# Patient Record
Sex: FEMALE | Race: WHITE | NOT HISPANIC OR LATINO | URBAN - METROPOLITAN AREA
[De-identification: names, ages, dates, MRNs, and addresses within clinical notes are randomized per-mention and may not be internally consistent; named-entity substitution may affect disease eponyms.]

---

## 2019-12-05 ENCOUNTER — OUTPATIENT (OUTPATIENT)
Dept: OUTPATIENT SERVICES | Facility: HOSPITAL | Age: 74
LOS: 1 days | End: 2019-12-05
Payer: MEDICARE

## 2019-12-05 PROCEDURE — 78227 HEPATOBIL SYST IMAGE W/DRUG: CPT

## 2019-12-05 PROCEDURE — 78227 HEPATOBIL SYST IMAGE W/DRUG: CPT | Mod: 26

## 2019-12-05 PROCEDURE — A9537: CPT

## 2019-12-19 ENCOUNTER — OUTPATIENT (OUTPATIENT)
Dept: OUTPATIENT SERVICES | Facility: HOSPITAL | Age: 74
LOS: 1 days | Discharge: HOME | End: 2019-12-19
Payer: MEDICARE

## 2019-12-19 ENCOUNTER — RESULT REVIEW (OUTPATIENT)
Age: 74
End: 2019-12-19

## 2019-12-19 PROCEDURE — 88302 TISSUE EXAM BY PATHOLOGIST: CPT | Mod: 26

## 2019-12-19 PROCEDURE — 49585: CPT | Mod: 82

## 2019-12-19 PROCEDURE — 88304 TISSUE EXAM BY PATHOLOGIST: CPT | Mod: 26

## 2019-12-19 PROCEDURE — S2900 ROBOTIC SURGICAL SYSTEM: CPT | Mod: NC

## 2019-12-19 PROCEDURE — 47562 LAPAROSCOPIC CHOLECYSTECTOMY: CPT | Mod: 82

## 2019-12-30 LAB — SURGICAL PATHOLOGY STUDY: SIGNIFICANT CHANGE UP

## 2021-09-20 ENCOUNTER — OUTPATIENT (OUTPATIENT)
Dept: OUTPATIENT SERVICES | Facility: HOSPITAL | Age: 76
LOS: 1 days | End: 2021-09-20
Payer: MEDICARE

## 2021-09-20 PROCEDURE — 73600 X-RAY EXAM OF ANKLE: CPT

## 2021-09-20 PROCEDURE — 73630 X-RAY EXAM OF FOOT: CPT

## 2021-09-20 PROCEDURE — 73600 X-RAY EXAM OF ANKLE: CPT | Mod: 26,RT

## 2021-09-20 PROCEDURE — 73630 X-RAY EXAM OF FOOT: CPT | Mod: 26,RT

## 2022-01-20 ENCOUNTER — TRANSCRIPTION ENCOUNTER (OUTPATIENT)
Age: 77
End: 2022-01-20

## 2022-01-20 VITALS
SYSTOLIC BLOOD PRESSURE: 165 MMHG | RESPIRATION RATE: 16 BRPM | OXYGEN SATURATION: 100 % | WEIGHT: 124.34 LBS | TEMPERATURE: 98 F | HEIGHT: 64 IN | DIASTOLIC BLOOD PRESSURE: 92 MMHG | HEART RATE: 87 BPM

## 2022-01-20 RX ORDER — POVIDONE-IODINE 5 %
1 AEROSOL (ML) TOPICAL ONCE
Refills: 0 | Status: COMPLETED | OUTPATIENT
Start: 2022-01-21 | End: 2022-01-21

## 2022-01-20 RX ORDER — SUCRALFATE 1 G
1 TABLET ORAL
Qty: 0 | Refills: 0 | DISCHARGE

## 2022-01-20 NOTE — PATIENT PROFILE ADULT - STATED REASON FOR ADMISSION
total ankle replacement, PSBL achilles lengthening, right total ankle replacement, PSBL achilles lengthening, right..

## 2022-01-20 NOTE — H&P ADULT - NSHPLABSRESULTS_GEN_ALL_CORE
Preop CBC, BMP, PT/INR, PTT WNL  SCr 0.72   Preop EKG WNL per clearance  COVID swab on 01/17 - negative  3M DOS Preop CBC, BMP, PT/INR, PTT WNL  SCr 0.72   Preop EKG WNL per clearance  COVID swab on 01/17 - negative  Povidone iodine nasal swab to be given day of surgery

## 2022-01-20 NOTE — H&P ADULT - NSICDXPASTSURGICALHX_GEN_ALL_CORE_FT
PAST SURGICAL HISTORY:  H/O arthroscopy of knee leftbrest lumpectomy    History of ankle surgery right    History of cholecystectomy     History of lumpectomy of left breast 2021    History of tonsillectomy and adenoidectomy     S/P anal fissurectomy

## 2022-01-20 NOTE — H&P ADULT - PROBLEM SELECTOR PLAN 1
Admit to Orthopedic service  For elective right total ankle replacement, possible Achilles lengthening, possible calcaneal osteotomy   Medically cleared for surgery by Dr. Moncada

## 2022-01-20 NOTE — PATIENT PROFILE ADULT - FALL HARM RISK - HARM RISK INTERVENTIONS
Assistance with ambulation/Assistance OOB with selected safe patient handling equipment/Communicate Risk of Fall with Harm to all staff/Discuss with provider need for PT consult/Monitor gait and stability/Provide patient with walking aids - walker, cane, crutches/Reinforce activity limits and safety measures with patient and family/Sit up slowly, dangle for a short time, stand at bedside before walking/Tailored Fall Risk Interventions/Use of alarms - bed, chair and/or voice tab/Visual Cue: Yellow wristband and red socks/Bed in lowest position, wheels locked, appropriate side rails in place/Call bell, personal items and telephone in reach/Instruct patient to call for assistance before getting out of bed or chair/Non-slip footwear when patient is out of bed/[place] to call system/Physically safe environment - no spills, clutter or unnecessary equipment/Purposeful Proactive Rounding/Room/bathroom lighting operational, light cord in reach

## 2022-01-20 NOTE — H&P ADULT - NSHPPHYSICALEXAM_GEN_ALL_CORE
MSK: decreased ROM of right ankle secondary to pain    Rest of PE per medical clearance MSK: Skin warm and well perfused.  Sensation intact and equal bilateral lower extremities. EHL/TA/GS/FHL firing bilateral lower extremities. decreased ROM of right ankle secondary to pain    Rest of PE per medical clearance

## 2022-01-20 NOTE — H&P ADULT - HISTORY OF PRESENT ILLNESS
76 yo F with right ankle pain x     Presents today for elective right total ankle replacement, possible Achilles lengthening, possible calcaneal osteotomy. 78 y/o F with right ankle pain x chronic. The patient states that she sustained an ankle fracture in 1976 and had two subsequent ankle surgeries but has continued pain. Wears an ankle brace sometimes, used to use a cane but does not anymore. Failed conservative therapies for her symptoms. Ambulates without assistive walking devices. Denies history of DVT.   Presents today for elective right total ankle replacement, possible Achilles lengthening, possible calcaneal osteotomy.

## 2022-01-21 ENCOUNTER — INPATIENT (INPATIENT)
Facility: HOSPITAL | Age: 77
LOS: 1 days | Discharge: HOME | DRG: 469 | End: 2022-01-23
Attending: ORTHOPAEDIC SURGERY | Admitting: ORTHOPAEDIC SURGERY
Payer: MEDICARE

## 2022-01-21 DIAGNOSIS — M19.071 PRIMARY OSTEOARTHRITIS, RIGHT ANKLE AND FOOT: ICD-10-CM

## 2022-01-21 DIAGNOSIS — Z98.890 OTHER SPECIFIED POSTPROCEDURAL STATES: Chronic | ICD-10-CM

## 2022-01-21 DIAGNOSIS — Z90.89 ACQUIRED ABSENCE OF OTHER ORGANS: Chronic | ICD-10-CM

## 2022-01-21 DIAGNOSIS — C50.919 MALIGNANT NEOPLASM OF UNSPECIFIED SITE OF UNSPECIFIED FEMALE BREAST: ICD-10-CM

## 2022-01-21 DIAGNOSIS — Z90.49 ACQUIRED ABSENCE OF OTHER SPECIFIED PARTS OF DIGESTIVE TRACT: Chronic | ICD-10-CM

## 2022-01-21 DIAGNOSIS — E78.00 PURE HYPERCHOLESTEROLEMIA, UNSPECIFIED: ICD-10-CM

## 2022-01-21 PROCEDURE — 73610 X-RAY EXAM OF ANKLE: CPT | Mod: 26,RT

## 2022-01-21 DEVICE — K-WIRE BRASSELER (EXTRA SHARP) DOUBLE DIAMOND 1.6MM X 4": Type: IMPLANTABLE DEVICE | Status: FUNCTIONAL

## 2022-01-21 DEVICE — IMPLANTABLE DEVICE: Type: IMPLANTABLE DEVICE | Status: FUNCTIONAL

## 2022-01-21 DEVICE — SCREW INBONE BONE REMOVER STERILE: Type: IMPLANTABLE DEVICE | Status: FUNCTIONAL

## 2022-01-21 RX ORDER — POLYETHYLENE GLYCOL 3350 17 G/17G
17 POWDER, FOR SOLUTION ORAL AT BEDTIME
Refills: 0 | Status: DISCONTINUED | OUTPATIENT
Start: 2022-01-21 | End: 2022-01-23

## 2022-01-21 RX ORDER — SENNA PLUS 8.6 MG/1
2 TABLET ORAL AT BEDTIME
Refills: 0 | Status: DISCONTINUED | OUTPATIENT
Start: 2022-01-21 | End: 2022-01-23

## 2022-01-21 RX ORDER — MORPHINE SULFATE 50 MG/1
2 CAPSULE, EXTENDED RELEASE ORAL
Refills: 0 | Status: DISCONTINUED | OUTPATIENT
Start: 2022-01-21 | End: 2022-01-23

## 2022-01-21 RX ORDER — MAGNESIUM HYDROXIDE 400 MG/1
30 TABLET, CHEWABLE ORAL DAILY
Refills: 0 | Status: DISCONTINUED | OUTPATIENT
Start: 2022-01-21 | End: 2022-01-23

## 2022-01-21 RX ORDER — SUCRALFATE 1 G
2 TABLET ORAL
Refills: 0 | Status: DISCONTINUED | OUTPATIENT
Start: 2022-01-21 | End: 2022-01-23

## 2022-01-21 RX ORDER — FAMOTIDINE 10 MG/ML
40 INJECTION INTRAVENOUS DAILY
Refills: 0 | Status: DISCONTINUED | OUTPATIENT
Start: 2022-01-21 | End: 2022-01-23

## 2022-01-21 RX ORDER — ASPIRIN/CALCIUM CARB/MAGNESIUM 324 MG
325 TABLET ORAL
Refills: 0 | Status: DISCONTINUED | OUTPATIENT
Start: 2022-01-21 | End: 2022-01-23

## 2022-01-21 RX ORDER — HYDROMORPHONE HYDROCHLORIDE 2 MG/ML
0.5 INJECTION INTRAMUSCULAR; INTRAVENOUS; SUBCUTANEOUS EVERY 4 HOURS
Refills: 0 | Status: DISCONTINUED | OUTPATIENT
Start: 2022-01-21 | End: 2022-01-23

## 2022-01-21 RX ORDER — TRAMADOL HYDROCHLORIDE 50 MG/1
25 TABLET ORAL EVERY 6 HOURS
Refills: 0 | Status: DISCONTINUED | OUTPATIENT
Start: 2022-01-21 | End: 2022-01-23

## 2022-01-21 RX ORDER — TRAMADOL HYDROCHLORIDE 50 MG/1
50 TABLET ORAL EVERY 6 HOURS
Refills: 0 | Status: DISCONTINUED | OUTPATIENT
Start: 2022-01-21 | End: 2022-01-23

## 2022-01-21 RX ORDER — SIMVASTATIN 20 MG/1
1 TABLET, FILM COATED ORAL
Qty: 0 | Refills: 0 | DISCHARGE

## 2022-01-21 RX ORDER — FAMOTIDINE 10 MG/ML
1 INJECTION INTRAVENOUS
Qty: 0 | Refills: 0 | DISCHARGE

## 2022-01-21 RX ORDER — SIMVASTATIN 20 MG/1
40 TABLET, FILM COATED ORAL AT BEDTIME
Refills: 0 | Status: DISCONTINUED | OUTPATIENT
Start: 2022-01-21 | End: 2022-01-23

## 2022-01-21 RX ORDER — CEFAZOLIN SODIUM 1 G
2000 VIAL (EA) INJECTION EVERY 8 HOURS
Refills: 0 | Status: COMPLETED | OUTPATIENT
Start: 2022-01-21 | End: 2022-01-21

## 2022-01-21 RX ORDER — CELECOXIB 200 MG/1
200 CAPSULE ORAL EVERY 12 HOURS
Refills: 0 | Status: DISCONTINUED | OUTPATIENT
Start: 2022-01-21 | End: 2022-01-23

## 2022-01-21 RX ORDER — CHLORHEXIDINE GLUCONATE 213 G/1000ML
1 SOLUTION TOPICAL ONCE
Refills: 0 | Status: COMPLETED | OUTPATIENT
Start: 2022-01-21 | End: 2022-01-21

## 2022-01-21 RX ORDER — SODIUM CHLORIDE 9 MG/ML
1000 INJECTION, SOLUTION INTRAVENOUS
Refills: 0 | Status: DISCONTINUED | OUTPATIENT
Start: 2022-01-22 | End: 2022-01-23

## 2022-01-21 RX ORDER — ACETAMINOPHEN 500 MG
650 TABLET ORAL EVERY 6 HOURS
Refills: 0 | Status: DISCONTINUED | OUTPATIENT
Start: 2022-01-21 | End: 2022-01-23

## 2022-01-21 RX ORDER — ONDANSETRON 8 MG/1
4 TABLET, FILM COATED ORAL EVERY 6 HOURS
Refills: 0 | Status: DISCONTINUED | OUTPATIENT
Start: 2022-01-21 | End: 2022-01-23

## 2022-01-21 RX ORDER — SUCRALFATE 1 G
2 TABLET ORAL
Qty: 0 | Refills: 0 | DISCHARGE

## 2022-01-21 RX ADMIN — TRAMADOL HYDROCHLORIDE 25 MILLIGRAM(S): 50 TABLET ORAL at 13:24

## 2022-01-21 RX ADMIN — CHLORHEXIDINE GLUCONATE 1 APPLICATION(S): 213 SOLUTION TOPICAL at 06:53

## 2022-01-21 RX ADMIN — Medication 100 MILLIGRAM(S): at 23:17

## 2022-01-21 RX ADMIN — TRAMADOL HYDROCHLORIDE 50 MILLIGRAM(S): 50 TABLET ORAL at 14:15

## 2022-01-21 RX ADMIN — Medication 650 MILLIGRAM(S): at 19:51

## 2022-01-21 RX ADMIN — CELECOXIB 200 MILLIGRAM(S): 200 CAPSULE ORAL at 18:36

## 2022-01-21 RX ADMIN — Medication 325 MILLIGRAM(S): at 17:36

## 2022-01-21 RX ADMIN — CELECOXIB 200 MILLIGRAM(S): 200 CAPSULE ORAL at 17:36

## 2022-01-21 RX ADMIN — Medication 2 GRAM(S): at 22:07

## 2022-01-21 RX ADMIN — TRAMADOL HYDROCHLORIDE 50 MILLIGRAM(S): 50 TABLET ORAL at 15:15

## 2022-01-21 RX ADMIN — Medication 1 APPLICATION(S): at 06:51

## 2022-01-21 RX ADMIN — HYDROMORPHONE HYDROCHLORIDE 0.5 MILLIGRAM(S): 2 INJECTION INTRAMUSCULAR; INTRAVENOUS; SUBCUTANEOUS at 17:49

## 2022-01-21 RX ADMIN — Medication 100 MILLIGRAM(S): at 17:24

## 2022-01-21 RX ADMIN — SENNA PLUS 2 TABLET(S): 8.6 TABLET ORAL at 22:08

## 2022-01-21 RX ADMIN — Medication 650 MILLIGRAM(S): at 18:51

## 2022-01-21 RX ADMIN — HYDROMORPHONE HYDROCHLORIDE 0.5 MILLIGRAM(S): 2 INJECTION INTRAMUSCULAR; INTRAVENOUS; SUBCUTANEOUS at 17:34

## 2022-01-21 RX ADMIN — POLYETHYLENE GLYCOL 3350 17 GRAM(S): 17 POWDER, FOR SOLUTION ORAL at 22:08

## 2022-01-21 RX ADMIN — Medication 650 MILLIGRAM(S): at 15:30

## 2022-01-21 RX ADMIN — SIMVASTATIN 40 MILLIGRAM(S): 20 TABLET, FILM COATED ORAL at 22:08

## 2022-01-21 RX ADMIN — Medication 650 MILLIGRAM(S): at 14:30

## 2022-01-21 RX ADMIN — Medication 650 MILLIGRAM(S): at 23:17

## 2022-01-21 NOTE — BRIEF OPERATIVE NOTE - OPERATION/FINDINGS
See operative note/dictation    Total ankle arthroplasty with Live Life 360 Total Ankle system  Size 1 Tibia, Size 1 Talus, Size 6mm thickness poly insert    Prophylactic fixation of medial malleolus with 4.0 x 46mm cannulated screw

## 2022-01-22 ENCOUNTER — TRANSCRIPTION ENCOUNTER (OUTPATIENT)
Age: 77
End: 2022-01-22

## 2022-01-22 LAB
ANION GAP SERPL CALC-SCNC: 11 MMOL/L — SIGNIFICANT CHANGE UP (ref 5–17)
BUN SERPL-MCNC: 7 MG/DL — SIGNIFICANT CHANGE UP (ref 7–23)
CALCIUM SERPL-MCNC: 9.3 MG/DL — SIGNIFICANT CHANGE UP (ref 8.4–10.5)
CHLORIDE SERPL-SCNC: 103 MMOL/L — SIGNIFICANT CHANGE UP (ref 96–108)
CO2 SERPL-SCNC: 25 MMOL/L — SIGNIFICANT CHANGE UP (ref 22–31)
CREAT SERPL-MCNC: 0.57 MG/DL — SIGNIFICANT CHANGE UP (ref 0.5–1.3)
GLUCOSE SERPL-MCNC: 118 MG/DL — HIGH (ref 70–99)
HCT VFR BLD CALC: 35.3 % — SIGNIFICANT CHANGE UP (ref 34.5–45)
HGB BLD-MCNC: 11.4 G/DL — LOW (ref 11.5–15.5)
MCHC RBC-ENTMCNC: 29.8 PG — SIGNIFICANT CHANGE UP (ref 27–34)
MCHC RBC-ENTMCNC: 32.3 GM/DL — SIGNIFICANT CHANGE UP (ref 32–36)
MCV RBC AUTO: 92.2 FL — SIGNIFICANT CHANGE UP (ref 80–100)
NRBC # BLD: 0 /100 WBCS — SIGNIFICANT CHANGE UP (ref 0–0)
PLATELET # BLD AUTO: 297 K/UL — SIGNIFICANT CHANGE UP (ref 150–400)
POTASSIUM SERPL-MCNC: 3.5 MMOL/L — SIGNIFICANT CHANGE UP (ref 3.5–5.3)
POTASSIUM SERPL-SCNC: 3.5 MMOL/L — SIGNIFICANT CHANGE UP (ref 3.5–5.3)
RBC # BLD: 3.83 M/UL — SIGNIFICANT CHANGE UP (ref 3.8–5.2)
RBC # FLD: 13.1 % — SIGNIFICANT CHANGE UP (ref 10.3–14.5)
SODIUM SERPL-SCNC: 139 MMOL/L — SIGNIFICANT CHANGE UP (ref 135–145)
WBC # BLD: 11.54 K/UL — HIGH (ref 3.8–10.5)
WBC # FLD AUTO: 11.54 K/UL — HIGH (ref 3.8–10.5)

## 2022-01-22 RX ORDER — PANTOPRAZOLE SODIUM 20 MG/1
40 TABLET, DELAYED RELEASE ORAL DAILY
Refills: 0 | Status: DISCONTINUED | OUTPATIENT
Start: 2022-01-22 | End: 2022-01-23

## 2022-01-22 RX ORDER — ACETAMINOPHEN 500 MG
2 TABLET ORAL
Qty: 0 | Refills: 0 | DISCHARGE
Start: 2022-01-22

## 2022-01-22 RX ORDER — CELECOXIB 200 MG/1
1 CAPSULE ORAL
Qty: 0 | Refills: 0 | DISCHARGE
Start: 2022-01-22

## 2022-01-22 RX ORDER — POLYETHYLENE GLYCOL 3350 17 G/17G
17 POWDER, FOR SOLUTION ORAL
Qty: 0 | Refills: 0 | DISCHARGE
Start: 2022-01-22

## 2022-01-22 RX ORDER — TRAMADOL HYDROCHLORIDE 50 MG/1
0.5 TABLET ORAL
Qty: 0 | Refills: 0 | DISCHARGE
Start: 2022-01-22

## 2022-01-22 RX ORDER — ASPIRIN/CALCIUM CARB/MAGNESIUM 324 MG
1 TABLET ORAL
Qty: 0 | Refills: 0 | DISCHARGE
Start: 2022-01-22

## 2022-01-22 RX ADMIN — CELECOXIB 200 MILLIGRAM(S): 200 CAPSULE ORAL at 06:30

## 2022-01-22 RX ADMIN — CELECOXIB 200 MILLIGRAM(S): 200 CAPSULE ORAL at 05:30

## 2022-01-22 RX ADMIN — Medication 650 MILLIGRAM(S): at 06:30

## 2022-01-22 RX ADMIN — Medication 2 GRAM(S): at 21:30

## 2022-01-22 RX ADMIN — Medication 650 MILLIGRAM(S): at 23:50

## 2022-01-22 RX ADMIN — Medication 650 MILLIGRAM(S): at 00:17

## 2022-01-22 RX ADMIN — Medication 325 MILLIGRAM(S): at 05:30

## 2022-01-22 RX ADMIN — Medication 325 MILLIGRAM(S): at 18:39

## 2022-01-22 RX ADMIN — SIMVASTATIN 40 MILLIGRAM(S): 20 TABLET, FILM COATED ORAL at 21:30

## 2022-01-22 RX ADMIN — ONDANSETRON 4 MILLIGRAM(S): 8 TABLET, FILM COATED ORAL at 00:33

## 2022-01-22 RX ADMIN — Medication 650 MILLIGRAM(S): at 19:39

## 2022-01-22 RX ADMIN — Medication 650 MILLIGRAM(S): at 05:30

## 2022-01-22 RX ADMIN — Medication 650 MILLIGRAM(S): at 18:39

## 2022-01-22 RX ADMIN — FAMOTIDINE 40 MILLIGRAM(S): 10 INJECTION INTRAVENOUS at 11:03

## 2022-01-22 RX ADMIN — Medication 650 MILLIGRAM(S): at 12:06

## 2022-01-22 RX ADMIN — Medication 650 MILLIGRAM(S): at 11:06

## 2022-01-22 NOTE — PHYSICAL THERAPY INITIAL EVALUATION ADULT - ADDITIONAL COMMENTS
pt states that she will be staying w/ her sister at d/c and will have 2 steps to enter the apartment. States she has used crutches in the past but does not own any. states that she was independent in ADLs prior to this admission

## 2022-01-22 NOTE — DISCHARGE NOTE PROVIDER - CARE PROVIDER_API CALL
Stefan Dill)  Orthopaedic Surgery  [address] [place] [address]  Phone: [phone]  Fax: [phone]  Follow Up Time: 2 weeks

## 2022-01-22 NOTE — PHYSICAL THERAPY INITIAL EVALUATION ADULT - DISCHARGE DISPOSITION, PT EVAL
home progressing to outpatient PT when medically appropriate; offered HPT pt declined. provided pt w/ axillary crutches and sized, also educated pt about knee scooter

## 2022-01-22 NOTE — DISCHARGE NOTE PROVIDER - HOSPITAL COURSE
Admitted: 1/21/22  Surgery: 1/21/22  Ani-op Antibiotics: Ancef  Pain control  DVT prophylaxis: 81mg daily  OOB/Physical Therapy: Non weight bearing

## 2022-01-22 NOTE — DISCHARGE NOTE PROVIDER - NSDCFUADDINST_GEN_ALL_CORE_FT
ACTIVITY:  - Do not bear weight on your right lower extremity until cleared by your surgeon. Remain in splint until you see your surgeon.   - You may experience postoperative swelling on the operative extremity. This is normal. You may elevate the leg to help with swelling.     DRESSING:  - You are in a splint. Do not get your splint wet or remove until you see your surgeon.    MEDICATION/ANTICOAGULATION:  - You have been prescribed Aspirin as a preventative to help prevent postoperative blood clots. Please take this medication as prescribed.   - Please follow instructions on medication bottles sent by your surgeon's office.   - Narcotic medications may cause constipation. You may use Miralax or Senna until regular bowel movements return.   - Continue to take your home famotidine, especially while taking Aspirin.   - If you experience any negative side effects of your medications, please call your surgeon's office to discuss.    Follow-up:  - Call to schedule an appt with Dr. Dill for follow up. If you have staples or sutures they will be removed in office.  - Please follow-up with your primary care physician or any other specialist you see postoperatively, if needed.     - Contact your doctor if you experience: fever greater than 101.5, chills, chest pain, difficulty breathing, redness or excessive drainage around the incision, other concerns.

## 2022-01-23 ENCOUNTER — TRANSCRIPTION ENCOUNTER (OUTPATIENT)
Age: 77
End: 2022-01-23

## 2022-01-23 VITALS — HEART RATE: 82 BPM | OXYGEN SATURATION: 98 % | SYSTOLIC BLOOD PRESSURE: 150 MMHG | DIASTOLIC BLOOD PRESSURE: 80 MMHG

## 2022-01-23 LAB
ANION GAP SERPL CALC-SCNC: 14 MMOL/L — SIGNIFICANT CHANGE UP (ref 5–17)
BUN SERPL-MCNC: 9 MG/DL — SIGNIFICANT CHANGE UP (ref 7–23)
CALCIUM SERPL-MCNC: 9.7 MG/DL — SIGNIFICANT CHANGE UP (ref 8.4–10.5)
CHLORIDE SERPL-SCNC: 104 MMOL/L — SIGNIFICANT CHANGE UP (ref 96–108)
CO2 SERPL-SCNC: 22 MMOL/L — SIGNIFICANT CHANGE UP (ref 22–31)
CREAT SERPL-MCNC: 0.64 MG/DL — SIGNIFICANT CHANGE UP (ref 0.5–1.3)
GLUCOSE SERPL-MCNC: 110 MG/DL — HIGH (ref 70–99)
HCT VFR BLD CALC: 36.6 % — SIGNIFICANT CHANGE UP (ref 34.5–45)
HGB BLD-MCNC: 11.9 G/DL — SIGNIFICANT CHANGE UP (ref 11.5–15.5)
MCHC RBC-ENTMCNC: 29.8 PG — SIGNIFICANT CHANGE UP (ref 27–34)
MCHC RBC-ENTMCNC: 32.5 GM/DL — SIGNIFICANT CHANGE UP (ref 32–36)
MCV RBC AUTO: 91.5 FL — SIGNIFICANT CHANGE UP (ref 80–100)
NRBC # BLD: 0 /100 WBCS — SIGNIFICANT CHANGE UP (ref 0–0)
PLATELET # BLD AUTO: 325 K/UL — SIGNIFICANT CHANGE UP (ref 150–400)
POTASSIUM SERPL-MCNC: 3.5 MMOL/L — SIGNIFICANT CHANGE UP (ref 3.5–5.3)
POTASSIUM SERPL-SCNC: 3.5 MMOL/L — SIGNIFICANT CHANGE UP (ref 3.5–5.3)
RBC # BLD: 4 M/UL — SIGNIFICANT CHANGE UP (ref 3.8–5.2)
RBC # FLD: 13.2 % — SIGNIFICANT CHANGE UP (ref 10.3–14.5)
SODIUM SERPL-SCNC: 140 MMOL/L — SIGNIFICANT CHANGE UP (ref 135–145)
WBC # BLD: 11.89 K/UL — HIGH (ref 3.8–10.5)
WBC # FLD AUTO: 11.89 K/UL — HIGH (ref 3.8–10.5)

## 2022-01-23 PROCEDURE — C1713: CPT

## 2022-01-23 PROCEDURE — 73610 X-RAY EXAM OF ANKLE: CPT

## 2022-01-23 PROCEDURE — C1776: CPT

## 2022-01-23 PROCEDURE — 97161 PT EVAL LOW COMPLEX 20 MIN: CPT

## 2022-01-23 PROCEDURE — 36415 COLL VENOUS BLD VENIPUNCTURE: CPT

## 2022-01-23 PROCEDURE — 97110 THERAPEUTIC EXERCISES: CPT

## 2022-01-23 PROCEDURE — 76000 FLUOROSCOPY <1 HR PHYS/QHP: CPT

## 2022-01-23 PROCEDURE — 80048 BASIC METABOLIC PNL TOTAL CA: CPT

## 2022-01-23 PROCEDURE — 85027 COMPLETE CBC AUTOMATED: CPT

## 2022-01-23 RX ORDER — CELECOXIB 200 MG/1
1 CAPSULE ORAL
Qty: 60 | Refills: 0
Start: 2022-01-23 | End: 2022-02-21

## 2022-01-23 RX ORDER — CELECOXIB 200 MG/1
1 CAPSULE ORAL
Qty: 30 | Refills: 0
Start: 2022-01-23 | End: 2022-02-21

## 2022-01-23 RX ORDER — CELECOXIB 200 MG/1
1 CAPSULE ORAL
Qty: 30 | Refills: 0
Start: 2022-01-23

## 2022-01-23 RX ADMIN — Medication 650 MILLIGRAM(S): at 05:21

## 2022-01-23 RX ADMIN — Medication 325 MILLIGRAM(S): at 05:21

## 2022-01-23 RX ADMIN — FAMOTIDINE 40 MILLIGRAM(S): 10 INJECTION INTRAVENOUS at 06:02

## 2022-01-23 RX ADMIN — Medication 650 MILLIGRAM(S): at 00:50

## 2022-01-23 RX ADMIN — CELECOXIB 200 MILLIGRAM(S): 200 CAPSULE ORAL at 05:22

## 2022-01-23 RX ADMIN — CELECOXIB 200 MILLIGRAM(S): 200 CAPSULE ORAL at 06:22

## 2022-01-23 RX ADMIN — Medication 650 MILLIGRAM(S): at 06:21

## 2022-01-23 NOTE — OCCUPATIONAL THERAPY INITIAL EVALUATION ADULT - ADDITIONAL COMMENTS
Pt lives alone in an elevator access apartment with ramp to enter. Pt owns a tub/shower. Pt reports she has a "waterproof stool" she can put in her shower at home. Pt plans on going home with her sister upon d/c, her sister lives in an elevator access apartment with 2-3 LAKESHA. Pt's sister owns a tub/shower with grab bar. Pt reports that prior to admission, pt was independent in all ADLs and IADLs, and ambulates with no device.

## 2022-01-23 NOTE — OCCUPATIONAL THERAPY INITIAL EVALUATION ADULT - LEVEL OF INDEPENDENCE: TUB, REHAB EVAL
not performed however educated pt on safety with completing transfer, recommended pt to purchase tub transfer bench for home to maximize safety with tub transfer and RLE NWB

## 2022-01-23 NOTE — OCCUPATIONAL THERAPY INITIAL EVALUATION ADULT - DIAGNOSIS, OT EVAL
Pt presents s/p total ankle arthoplasty performed on 1/21/22. Pt presents with no deficits impacting her ability to complete ADLs at this time.

## 2022-01-23 NOTE — OCCUPATIONAL THERAPY INITIAL EVALUATION ADULT - NS ASR OT EQUIP NEEDS DISCH
educated on tub bench and bed railing pt administered axillary crutches from PT. Pt educated on tub bench and bed railing and where to purchase/crutches

## 2022-01-23 NOTE — OCCUPATIONAL THERAPY INITIAL EVALUATION ADULT - MD ORDER
76 y/o F with right ankle pain x chronic. The patient states that she sustained an ankle fracture in 1976 and had two subsequent ankle surgeries but has continued pain. Wears an ankle brace sometimes, used to use a cane but does not anymore. Failed conservative therapies for her symptoms. Ambulates without assistive walking devices. Denies history of DVT.   Presents today for elective right total ankle replacement, possible Achilles lengthening, possible calcaneal osteotomy.

## 2022-01-23 NOTE — OCCUPATIONAL THERAPY INITIAL EVALUATION ADULT - PHYSICAL ASSIST/NONPHYSICAL ASSIST:DRESS LOWER BODY, OT EVAL
to ottoniel/doff socks and ottoniel underwear seated edge of bed. Educated pt on getting dressed in sitting to maximize safety upon return home

## 2022-01-23 NOTE — DISCHARGE NOTE NURSING/CASE MANAGEMENT/SOCIAL WORK - PATIENT PORTAL LINK FT
You can access the FollowMyHealth Patient Portal offered by [facility] by registering at the following website: [email or web address]. By joining [facility]’s FollowMyHealth portal, you will also be able to view your health information using other applications (apps) compatible with our system.

## 2022-01-23 NOTE — OCCUPATIONAL THERAPY INITIAL EVALUATION ADULT - ANTICIPATED DISCHARGE DISPOSITION, OT EVAL
home with no needs and assist from sister as needed, Ortho MD Sinclair made aware home with no needs and assist from sister as needed, Ortho MD Sinclair made aware/no needs home with no needs and assist from sister as needed, Ortho MD Sinclair and PARIS Kohli made aware/no needs

## 2022-01-23 NOTE — OCCUPATIONAL THERAPY INITIAL EVALUATION ADULT - MANUAL MUSCLE TESTING RESULTS, REHAB EVAL
BUE shoulder flexion/extension 5/5, BUE bicep flexion/extensions 5/5, BUE  strength 5/5. LLE strength approx 4/5 in functional assessment against gravity.

## 2022-01-23 NOTE — OCCUPATIONAL THERAPY INITIAL EVALUATION ADULT - GENERAL OBSERVATIONS, REHAB EVAL
Pt's RN Анна cleared pt for therapy. Pt received semisupine in bed, +RLE ace wrap and dressing C/D/I. Pt agreeable and motivated for OT.

## 2022-01-23 NOTE — OCCUPATIONAL THERAPY INITIAL EVALUATION ADULT - MODIFIED CLINICAL TEST OF SENSORY INTEGRATION IN BALANCE TEST
Pt ambulated approx 30ft x2 with axillary crutches to/from bathroom with independence, no LOB noted. Pt ambulated approx 30ft with axillary crutches to/from bathroom with independence, no LOB noted.

## 2022-01-23 NOTE — DISCHARGE NOTE NURSING/CASE MANAGEMENT/SOCIAL WORK - NSDCPEFALRISK_GEN_ALL_CORE
For information on Fall & Injury Prevention, visit: [email or web address] OR  [email or web address] OR  https://www.cdc.gov/steadi/patient.html

## 2022-01-23 NOTE — PROGRESS NOTE ADULT - SUBJECTIVE AND OBJECTIVE BOX
Ortho AM Note    Procedure: Right total ankle arthroplasty   Surgeon: Dr. Dill    pain well controlled. no complaints .would like to go home today.       Vital Signs Last 24 Hrs  T(C): 36.8 (01-23-22 @ 05:15), Max: 36.8 (01-23-22 @ 05:15)  T(F): 98.2 (01-23-22 @ 05:15), Max: 98.2 (01-23-22 @ 05:15)  HR: 86 (01-23-22 @ 05:15) (86 - 86)  BP: 143/75 (01-23-22 @ 05:15) (143/75 - 143/75)  BP(mean): 97 (01-23-22 @ 05:15) (97 - 97)  RR: 18 (01-23-22 @ 05:15) (18 - 18)  SpO2: 97% (01-23-22 @ 05:15) (97% - 97%)    General: Pt Alert and oriented, NAD  Splint C/D/I RLE    Pulses: brisk cap refill all 5 toes RLE, 2+ DP LLE   Sensation: SILT LLE, decreased sensation RLE   Motor: EHL/TA/GS 0/5 RLE secondary to peripheral block         A/P: 77yFemale POD#2 s/p R TAA. DC today  - Stable  - Pain Control  - DVT ppx: ASA  - Post op abx: Ancef   - PT, WBS: NWB RLE    Ortho Pager [phone]
Ortho Post Op Check    Procedure: Right total ankle arthroplasty   Surgeon: Dr. Dill    Pt comfortable. She is starting to feel pain on the inside of her ankle. Denies CP, SOB, N/V.    T(C): 36.2 (01-21-22 @ 11:25), Max: 36.2 (01-21-22 @ 11:25)  HR: 78 (01-21-22 @ 12:55) (74 - 86)  BP: 137/66 (01-21-22 @ 12:55) (114/68 - 145/82)  SpO2: 96% (01-21-22 @ 12:55) (95% - 99%)  AVSS    General: Pt Alert and oriented, NAD  Splint C/D/I RLE    Pulses: brisk cap refill all 5 toes RLE, 2+ DP LLE   Sensation: SILT LLE, decreased sensation RLE   Motor: EHL/FHL/TA/GS 5/5 LLE, FHL 5/5 RLE, EHL/TA/GS 0/5 RLE secondary to peripheral block     Post-op X-Ray: TAA in good position     A/P: 77yFemale POD#0 s/p R TAA   - Stable  - Pain Control  - DVT ppx: ASA  - Post op abx: Ancef   - PT, WBS: NWB RLE    Ortho Pager [phone]
Ortho AM Note    Procedure: Right total ankle arthroplasty   Surgeon: Dr. Dill    States she had moderate pain overnight but much better this AM.   still diminished sensation from block.   no SOB or CP this AM.   would like to stay 1 more day     Vital Signs Last 24 Hrs  T(C): 36.6 (22 Jan 2022 04:34), Max: 37.2 (21 Jan 2022 20:57)  T(F): 97.9 (22 Jan 2022 04:34), Max: 98.9 (21 Jan 2022 20:57)  HR: 79 (22 Jan 2022 04:34) (74 - 97)  BP: 168/95 (22 Jan 2022 04:34) (114/68 - 210/97)  BP(mean): 119 (22 Jan 2022 04:34) (87 - 119)  RR: 17 (22 Jan 2022 04:34) (17 - 18)  SpO2: 98% (22 Jan 2022 04:34) (95% - 99%)    General: Pt Alert and oriented, NAD  Splint C/D/I RLE    Pulses: brisk cap refill all 5 toes RLE, 2+ DP LLE   Sensation: SILT LLE, decreased sensation RLE   Motor: EHL/TA/GS 0/5 RLE secondary to peripheral block         A/P: 77yFemale POD#1 s/p R TAA. Needs PT, likely DC today vs tomorrow.   - Stable  - Pain Control  - DVT ppx: ASA  - Post op abx: Ancef   - PT, WBS: NWB RLE    Ortho Pager [phone]

## 2022-01-25 DIAGNOSIS — M19.071 PRIMARY OSTEOARTHRITIS, RIGHT ANKLE AND FOOT: ICD-10-CM

## 2022-03-21 ENCOUNTER — OUTPATIENT (OUTPATIENT)
Dept: OUTPATIENT SERVICES | Facility: HOSPITAL | Age: 77
LOS: 1 days | End: 2022-03-21
Payer: MEDICARE

## 2022-03-21 DIAGNOSIS — Z98.890 OTHER SPECIFIED POSTPROCEDURAL STATES: Chronic | ICD-10-CM

## 2022-03-21 DIAGNOSIS — Z90.89 ACQUIRED ABSENCE OF OTHER ORGANS: Chronic | ICD-10-CM

## 2022-03-21 DIAGNOSIS — Z90.49 ACQUIRED ABSENCE OF OTHER SPECIFIED PARTS OF DIGESTIVE TRACT: Chronic | ICD-10-CM

## 2022-03-21 PROBLEM — E78.00 PURE HYPERCHOLESTEROLEMIA, UNSPECIFIED: Chronic | Status: ACTIVE | Noted: 2022-01-20

## 2022-03-21 PROBLEM — C50.919 MALIGNANT NEOPLASM OF UNSPECIFIED SITE OF UNSPECIFIED FEMALE BREAST: Chronic | Status: ACTIVE | Noted: 2022-01-20

## 2022-03-21 PROCEDURE — 73610 X-RAY EXAM OF ANKLE: CPT

## 2022-03-21 PROCEDURE — 73610 X-RAY EXAM OF ANKLE: CPT | Mod: 26,RT

## 2022-05-23 ENCOUNTER — OUTPATIENT (OUTPATIENT)
Dept: OUTPATIENT SERVICES | Facility: HOSPITAL | Age: 77
LOS: 1 days | End: 2022-05-23
Payer: MEDICARE

## 2022-05-23 DIAGNOSIS — Z90.89 ACQUIRED ABSENCE OF OTHER ORGANS: Chronic | ICD-10-CM

## 2022-05-23 DIAGNOSIS — Z90.49 ACQUIRED ABSENCE OF OTHER SPECIFIED PARTS OF DIGESTIVE TRACT: Chronic | ICD-10-CM

## 2022-05-23 DIAGNOSIS — Z98.890 OTHER SPECIFIED POSTPROCEDURAL STATES: Chronic | ICD-10-CM

## 2022-05-23 PROCEDURE — 73610 X-RAY EXAM OF ANKLE: CPT

## 2022-05-23 PROCEDURE — 73610 X-RAY EXAM OF ANKLE: CPT | Mod: 26,RT

## 2022-05-27 PROBLEM — Z00.00 ENCOUNTER FOR PREVENTIVE HEALTH EXAMINATION: Status: ACTIVE | Noted: 2022-05-27

## 2022-05-31 ENCOUNTER — OUTPATIENT (OUTPATIENT)
Dept: OUTPATIENT SERVICES | Facility: HOSPITAL | Age: 77
LOS: 1 days | End: 2022-05-31
Payer: MEDICARE

## 2022-05-31 ENCOUNTER — APPOINTMENT (OUTPATIENT)
Dept: ORTHOPEDIC SURGERY | Facility: CLINIC | Age: 77
End: 2022-05-31
Payer: MEDICARE

## 2022-05-31 ENCOUNTER — RESULT REVIEW (OUTPATIENT)
Age: 77
End: 2022-05-31

## 2022-05-31 VITALS
WEIGHT: 126 LBS | BODY MASS INDEX: 21.51 KG/M2 | DIASTOLIC BLOOD PRESSURE: 85 MMHG | HEART RATE: 91 BPM | HEIGHT: 64 IN | OXYGEN SATURATION: 77 % | SYSTOLIC BLOOD PRESSURE: 150 MMHG

## 2022-05-31 DIAGNOSIS — Z98.890 OTHER SPECIFIED POSTPROCEDURAL STATES: Chronic | ICD-10-CM

## 2022-05-31 DIAGNOSIS — Z87.39 PERSONAL HISTORY OF OTHER DISEASES OF THE MUSCULOSKELETAL SYSTEM AND CONNECTIVE TISSUE: ICD-10-CM

## 2022-05-31 DIAGNOSIS — F19.90 OTHER PSYCHOACTIVE SUBSTANCE USE, UNSPECIFIED, UNCOMPLICATED: ICD-10-CM

## 2022-05-31 DIAGNOSIS — Z72.3 LACK OF PHYSICAL EXERCISE: ICD-10-CM

## 2022-05-31 DIAGNOSIS — Z82.62 FAMILY HISTORY OF OSTEOPOROSIS: ICD-10-CM

## 2022-05-31 DIAGNOSIS — M17.11 UNILATERAL PRIMARY OSTEOARTHRITIS, RIGHT KNEE: ICD-10-CM

## 2022-05-31 DIAGNOSIS — Z80.0 FAMILY HISTORY OF MALIGNANT NEOPLASM OF DIGESTIVE ORGANS: ICD-10-CM

## 2022-05-31 DIAGNOSIS — Z85.3 PERSONAL HISTORY OF MALIGNANT NEOPLASM OF BREAST: ICD-10-CM

## 2022-05-31 DIAGNOSIS — Z78.9 OTHER SPECIFIED HEALTH STATUS: ICD-10-CM

## 2022-05-31 DIAGNOSIS — Z60.2 PROBLEMS RELATED TO LIVING ALONE: ICD-10-CM

## 2022-05-31 DIAGNOSIS — Z90.89 ACQUIRED ABSENCE OF OTHER ORGANS: Chronic | ICD-10-CM

## 2022-05-31 DIAGNOSIS — Z90.49 ACQUIRED ABSENCE OF OTHER SPECIFIED PARTS OF DIGESTIVE TRACT: Chronic | ICD-10-CM

## 2022-05-31 DIAGNOSIS — Z86.39 PERSONAL HISTORY OF OTHER ENDOCRINE, NUTRITIONAL AND METABOLIC DISEASE: ICD-10-CM

## 2022-05-31 PROCEDURE — 73564 X-RAY EXAM KNEE 4 OR MORE: CPT | Mod: 26,50

## 2022-05-31 PROCEDURE — 99204 OFFICE O/P NEW MOD 45 MIN: CPT | Mod: 25

## 2022-05-31 PROCEDURE — 73564 X-RAY EXAM KNEE 4 OR MORE: CPT

## 2022-05-31 PROCEDURE — 20610 DRAIN/INJ JOINT/BURSA W/O US: CPT | Mod: RT

## 2022-05-31 SDOH — SOCIAL STABILITY - SOCIAL INSECURITY: PROBLEMS RELATED TO LIVING ALONE: Z60.2

## 2022-06-01 PROBLEM — M17.11 PRIMARY OSTEOARTHRITIS OF RIGHT KNEE: Status: ACTIVE | Noted: 2022-06-01

## 2022-06-01 NOTE — DISCUSSION/SUMMARY
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 She received a Gel One injection today. She will increase her activities as tolerated. We will see her back on an as needed basis. She will call if any issues arise

## 2022-06-01 NOTE — HISTORY OF PRESENT ILLNESS
[de-identified] : Layla Grier is a 76 yo woman who presents with ongoing right knee issues over the last several years. She reports progressive medial knee pain and swelling. SHe has persistent pain and swelling. She has decreased ROM. She denies any locking or buckling, SHe had right ankle replacement in January. She is limited in her PT due to her knee issues. She has some mild left knee discomfort but her right knee is more symptomatic

## 2022-06-01 NOTE — PROCEDURE
[de-identified] : Under strict sterile technique, the right  knee was prepped with Betadine. Using the superolateral approach, with the patient supine, a 3mL injection of GelOne was administered intra-articularly. The patient tolerated the procedure well. The patient was instructed to avoid vigorous exercise for 48 hours and will apply ice to the knee for 20 minutes 2-3 times per day if discomfort occurs. Patient will return on an as needed basis. The patient will call if any questions or problems should arise.\par \par GEL ONE INJ- R. KNEE JOINT\par LOT NO: 0021 X15G\par EXP: 01-\par NDC: [id number]

## 2022-06-01 NOTE — PHYSICAL EXAM
[de-identified] : The patient is a well developed, well nourished female in no apparent distress. She is alert and oriented X 3 with a pleasant mood and appropriate affect.\par \par On physical examination of the right knee, her ROM is 0-120 degrees. The patient walks with a normal gait and stands in neutral alignment. There is trace  effusion. No warmth or erythema is noted. The patella is non tender to palpation medially or laterally. There is no crepitus noted. The apprehension and grind tests are negative. The extensor mechanism is intact. There is medial joint line tenderness. The Mateusz sign is positive. The Lachman and pivot shift tests are negative. There is no varus or valgus laxity at 0 or 30 degrees. No posterolateral or anteromedial laxity is noted. No masses are palpable. No other soft tissue or bony tenderness is noted. Quadriceps weakness is noted. Neurovascular function is intact. [de-identified] : Radiographs of both knees shows advanced medial compartment DJD in her right knee and moderate DJD in her left knee

## 2022-06-01 NOTE — END OF VISIT
[FreeTextEntry3] : All medical record entries made by MAHESH Toth, acting as a scribe for this encounter under the direction of Mendez Tee MD . I have reviewed the chart and agree that the record accurately reflects my personal performance of the history, physical exam, assessment and plan. I have also personally directed, reviewed, and agreed with the chart.
Statement Selected

## 2022-06-02 PROBLEM — Z87.39 HISTORY OF OSTEOPOROSIS: Status: RESOLVED | Noted: 2022-05-31 | Resolved: 2022-06-02

## 2022-06-02 PROBLEM — Z60.2 ELDERLY PERSON LIVING ALONE: Status: ACTIVE | Noted: 2022-05-31

## 2022-06-02 PROBLEM — F19.90 RECREATIONAL DRUG USE: Status: ACTIVE | Noted: 2022-05-31

## 2022-06-02 PROBLEM — Z86.39 HISTORY OF HIGH CHOLESTEROL: Status: RESOLVED | Noted: 2022-05-31 | Resolved: 2022-06-02

## 2022-06-02 PROBLEM — Z78.9 NON-SMOKER: Status: ACTIVE | Noted: 2022-05-31

## 2022-06-02 PROBLEM — Z78.9 NEVER SMOKED CIGARETTES: Status: ACTIVE | Noted: 2022-05-31

## 2022-06-02 PROBLEM — Z82.62 FAMILY HISTORY OF OSTEOPOROSIS: Status: ACTIVE | Noted: 2022-05-31

## 2022-06-02 PROBLEM — Z80.0 FAMILY HISTORY OF PANCREATIC CANCER: Status: ACTIVE | Noted: 2022-05-31

## 2022-06-02 PROBLEM — Z72.3 DOES NOT EXERCISE: Status: ACTIVE | Noted: 2022-05-31

## 2022-06-02 PROBLEM — Z85.3 HISTORY OF MALIGNANT NEOPLASM OF BREAST: Status: RESOLVED | Noted: 2022-05-31 | Resolved: 2022-06-02

## 2022-06-02 RX ORDER — SIMVASTATIN 40 MG/1
40 TABLET, FILM COATED ORAL
Qty: 90 | Refills: 0 | Status: ACTIVE | COMMUNITY
Start: 2022-04-27

## 2022-06-02 RX ORDER — SUCRALFATE 1 G/1
1 TABLET ORAL
Qty: 20 | Refills: 0 | Status: ACTIVE | COMMUNITY
Start: 2022-05-07

## 2022-06-02 RX ORDER — FAMOTIDINE 40 MG/1
40 TABLET, FILM COATED ORAL
Qty: 90 | Refills: 0 | Status: ACTIVE | COMMUNITY
Start: 2022-04-06

## 2022-06-02 RX ORDER — ESOMEPRAZOLE MAGNESIUM 40 MG/1
40 CAPSULE, DELAYED RELEASE ORAL
Qty: 30 | Refills: 0 | Status: ACTIVE | COMMUNITY
Start: 2022-05-05

## 2022-06-02 RX ORDER — ONDANSETRON 4 MG/1
4 TABLET ORAL
Qty: 40 | Refills: 0 | Status: ACTIVE | COMMUNITY
Start: 2022-05-05

## 2022-06-02 RX ORDER — CELECOXIB 200 MG/1
200 CAPSULE ORAL
Qty: 30 | Refills: 0 | Status: ACTIVE | COMMUNITY
Start: 2022-01-23

## 2022-06-02 RX ORDER — OXYCODONE AND ACETAMINOPHEN 5; 325 MG/1; MG/1
5-325 TABLET ORAL
Qty: 20 | Refills: 0 | Status: ACTIVE | COMMUNITY
Start: 2022-01-14

## 2022-09-12 ENCOUNTER — OUTPATIENT (OUTPATIENT)
Dept: OUTPATIENT SERVICES | Facility: HOSPITAL | Age: 77
LOS: 1 days | End: 2022-09-12
Payer: MEDICARE

## 2022-09-12 DIAGNOSIS — Z98.890 OTHER SPECIFIED POSTPROCEDURAL STATES: Chronic | ICD-10-CM

## 2022-09-12 DIAGNOSIS — Z90.49 ACQUIRED ABSENCE OF OTHER SPECIFIED PARTS OF DIGESTIVE TRACT: Chronic | ICD-10-CM

## 2022-09-12 DIAGNOSIS — Z90.89 ACQUIRED ABSENCE OF OTHER ORGANS: Chronic | ICD-10-CM

## 2022-09-12 PROCEDURE — 73610 X-RAY EXAM OF ANKLE: CPT | Mod: 26,RT

## 2022-09-12 PROCEDURE — 73610 X-RAY EXAM OF ANKLE: CPT

## 2022-10-06 ENCOUNTER — APPOINTMENT (OUTPATIENT)
Dept: ORTHOPEDIC SURGERY | Facility: CLINIC | Age: 77
End: 2022-10-06

## 2022-10-20 ENCOUNTER — EMERGENCY (EMERGENCY)
Facility: HOSPITAL | Age: 77
LOS: 1 days | Discharge: HOME | End: 2022-10-20
Attending: EMERGENCY MEDICINE | Admitting: EMERGENCY MEDICINE
Payer: MEDICARE

## 2022-10-20 VITALS
TEMPERATURE: 98 F | HEART RATE: 98 BPM | HEIGHT: 61 IN | OXYGEN SATURATION: 96 % | SYSTOLIC BLOOD PRESSURE: 164 MMHG | DIASTOLIC BLOOD PRESSURE: 96 MMHG | RESPIRATION RATE: 18 BRPM | WEIGHT: 130.95 LBS

## 2022-10-20 VITALS
DIASTOLIC BLOOD PRESSURE: 76 MMHG | HEART RATE: 70 BPM | OXYGEN SATURATION: 98 % | SYSTOLIC BLOOD PRESSURE: 155 MMHG | TEMPERATURE: 98 F | RESPIRATION RATE: 18 BRPM

## 2022-10-20 DIAGNOSIS — N20.0 CALCULUS OF KIDNEY: ICD-10-CM

## 2022-10-20 DIAGNOSIS — Z79.82 LONG TERM (CURRENT) USE OF ASPIRIN: ICD-10-CM

## 2022-10-20 DIAGNOSIS — Z85.3 PERSONAL HISTORY OF MALIGNANT NEOPLASM OF BREAST: ICD-10-CM

## 2022-10-20 DIAGNOSIS — Z90.89 ACQUIRED ABSENCE OF OTHER ORGANS: ICD-10-CM

## 2022-10-20 DIAGNOSIS — K21.9 GASTRO-ESOPHAGEAL REFLUX DISEASE WITHOUT ESOPHAGITIS: ICD-10-CM

## 2022-10-20 DIAGNOSIS — Z90.49 ACQUIRED ABSENCE OF OTHER SPECIFIED PARTS OF DIGESTIVE TRACT: ICD-10-CM

## 2022-10-20 DIAGNOSIS — Z20.822 CONTACT WITH AND (SUSPECTED) EXPOSURE TO COVID-19: ICD-10-CM

## 2022-10-20 DIAGNOSIS — Z98.890 OTHER SPECIFIED POSTPROCEDURAL STATES: Chronic | ICD-10-CM

## 2022-10-20 DIAGNOSIS — Z92.3 PERSONAL HISTORY OF IRRADIATION: ICD-10-CM

## 2022-10-20 DIAGNOSIS — Z90.49 ACQUIRED ABSENCE OF OTHER SPECIFIED PARTS OF DIGESTIVE TRACT: Chronic | ICD-10-CM

## 2022-10-20 DIAGNOSIS — Z90.89 ACQUIRED ABSENCE OF OTHER ORGANS: Chronic | ICD-10-CM

## 2022-10-20 DIAGNOSIS — E78.00 PURE HYPERCHOLESTEROLEMIA, UNSPECIFIED: ICD-10-CM

## 2022-10-20 DIAGNOSIS — Z87.442 PERSONAL HISTORY OF URINARY CALCULI: ICD-10-CM

## 2022-10-20 DIAGNOSIS — Z88.6 ALLERGY STATUS TO ANALGESIC AGENT: ICD-10-CM

## 2022-10-20 DIAGNOSIS — R10.31 RIGHT LOWER QUADRANT PAIN: ICD-10-CM

## 2022-10-20 LAB
ALBUMIN SERPL ELPH-MCNC: 4.8 G/DL — SIGNIFICANT CHANGE UP (ref 3.3–5)
ALP SERPL-CCNC: 125 U/L — HIGH (ref 40–120)
ALT FLD-CCNC: 33 U/L — SIGNIFICANT CHANGE UP (ref 10–45)
ANION GAP SERPL CALC-SCNC: 12 MMOL/L — SIGNIFICANT CHANGE UP (ref 5–17)
APPEARANCE UR: ABNORMAL
AST SERPL-CCNC: 26 U/L — SIGNIFICANT CHANGE UP (ref 10–40)
BACTERIA # UR AUTO: SIGNIFICANT CHANGE UP /HPF
BASOPHILS # BLD AUTO: 0.03 K/UL — SIGNIFICANT CHANGE UP (ref 0–0.2)
BASOPHILS NFR BLD AUTO: 0.4 % — SIGNIFICANT CHANGE UP (ref 0–2)
BILIRUB SERPL-MCNC: 0.5 MG/DL — SIGNIFICANT CHANGE UP (ref 0.2–1.2)
BILIRUB UR-MCNC: NEGATIVE — SIGNIFICANT CHANGE UP
BUN SERPL-MCNC: 17 MG/DL — SIGNIFICANT CHANGE UP (ref 7–23)
CALCIUM SERPL-MCNC: 9.3 MG/DL — SIGNIFICANT CHANGE UP (ref 8.4–10.5)
CHLORIDE SERPL-SCNC: 104 MMOL/L — SIGNIFICANT CHANGE UP (ref 96–108)
CO2 SERPL-SCNC: 23 MMOL/L — SIGNIFICANT CHANGE UP (ref 22–31)
COLOR SPEC: YELLOW — SIGNIFICANT CHANGE UP
COMMENT - URINE: SIGNIFICANT CHANGE UP
CREAT SERPL-MCNC: 0.76 MG/DL — SIGNIFICANT CHANGE UP (ref 0.5–1.3)
DIFF PNL FLD: ABNORMAL
EGFR: 80 ML/MIN/1.73M2 — SIGNIFICANT CHANGE UP
EOSINOPHIL # BLD AUTO: 0.14 K/UL — SIGNIFICANT CHANGE UP (ref 0–0.5)
EOSINOPHIL NFR BLD AUTO: 1.7 % — SIGNIFICANT CHANGE UP (ref 0–6)
EPI CELLS # UR: ABNORMAL /HPF (ref 0–5)
GLUCOSE SERPL-MCNC: 116 MG/DL — HIGH (ref 70–99)
GLUCOSE UR QL: NEGATIVE — SIGNIFICANT CHANGE UP
HCT VFR BLD CALC: 40 % — SIGNIFICANT CHANGE UP (ref 34.5–45)
HGB BLD-MCNC: 13 G/DL — SIGNIFICANT CHANGE UP (ref 11.5–15.5)
IMM GRANULOCYTES NFR BLD AUTO: 0.1 % — SIGNIFICANT CHANGE UP (ref 0–0.9)
KETONES UR-MCNC: NEGATIVE — SIGNIFICANT CHANGE UP
LEUKOCYTE ESTERASE UR-ACNC: ABNORMAL
LIDOCAIN IGE QN: 27 U/L — SIGNIFICANT CHANGE UP (ref 7–60)
LYMPHOCYTES # BLD AUTO: 1.59 K/UL — SIGNIFICANT CHANGE UP (ref 1–3.3)
LYMPHOCYTES # BLD AUTO: 19.8 % — SIGNIFICANT CHANGE UP (ref 13–44)
MCHC RBC-ENTMCNC: 29.1 PG — SIGNIFICANT CHANGE UP (ref 27–34)
MCHC RBC-ENTMCNC: 32.5 GM/DL — SIGNIFICANT CHANGE UP (ref 32–36)
MCV RBC AUTO: 89.5 FL — SIGNIFICANT CHANGE UP (ref 80–100)
MONOCYTES # BLD AUTO: 0.58 K/UL — SIGNIFICANT CHANGE UP (ref 0–0.9)
MONOCYTES NFR BLD AUTO: 7.2 % — SIGNIFICANT CHANGE UP (ref 2–14)
NEUTROPHILS # BLD AUTO: 5.69 K/UL — SIGNIFICANT CHANGE UP (ref 1.8–7.4)
NEUTROPHILS NFR BLD AUTO: 70.8 % — SIGNIFICANT CHANGE UP (ref 43–77)
NITRITE UR-MCNC: NEGATIVE — SIGNIFICANT CHANGE UP
NRBC # BLD: 0 /100 WBCS — SIGNIFICANT CHANGE UP (ref 0–0)
PH UR: 6 — SIGNIFICANT CHANGE UP (ref 5–8)
PLATELET # BLD AUTO: 335 K/UL — SIGNIFICANT CHANGE UP (ref 150–400)
POTASSIUM SERPL-MCNC: 4.1 MMOL/L — SIGNIFICANT CHANGE UP (ref 3.5–5.3)
POTASSIUM SERPL-SCNC: 4.1 MMOL/L — SIGNIFICANT CHANGE UP (ref 3.5–5.3)
PROT SERPL-MCNC: 7.5 G/DL — SIGNIFICANT CHANGE UP (ref 6–8.3)
PROT UR-MCNC: NEGATIVE MG/DL — SIGNIFICANT CHANGE UP
RBC # BLD: 4.47 M/UL — SIGNIFICANT CHANGE UP (ref 3.8–5.2)
RBC # FLD: 13 % — SIGNIFICANT CHANGE UP (ref 10.3–14.5)
RBC CASTS # UR COMP ASSIST: < 5 /HPF — SIGNIFICANT CHANGE UP
SARS-COV-2 RNA SPEC QL NAA+PROBE: NEGATIVE — SIGNIFICANT CHANGE UP
SODIUM SERPL-SCNC: 139 MMOL/L — SIGNIFICANT CHANGE UP (ref 135–145)
SP GR SPEC: 1.01 — SIGNIFICANT CHANGE UP (ref 1–1.03)
UROBILINOGEN FLD QL: 0.2 E.U./DL — SIGNIFICANT CHANGE UP
WBC # BLD: 8.04 K/UL — SIGNIFICANT CHANGE UP (ref 3.8–10.5)
WBC # FLD AUTO: 8.04 K/UL — SIGNIFICANT CHANGE UP (ref 3.8–10.5)
WBC UR QL: ABNORMAL /HPF

## 2022-10-20 PROCEDURE — 96375 TX/PRO/DX INJ NEW DRUG ADDON: CPT

## 2022-10-20 PROCEDURE — 80053 COMPREHEN METABOLIC PANEL: CPT

## 2022-10-20 PROCEDURE — 85025 COMPLETE CBC W/AUTO DIFF WBC: CPT

## 2022-10-20 PROCEDURE — 99284 EMERGENCY DEPT VISIT MOD MDM: CPT | Mod: 25

## 2022-10-20 PROCEDURE — 99285 EMERGENCY DEPT VISIT HI MDM: CPT | Mod: FS

## 2022-10-20 PROCEDURE — 74177 CT ABD & PELVIS W/CONTRAST: CPT | Mod: 26,MA

## 2022-10-20 PROCEDURE — 81001 URINALYSIS AUTO W/SCOPE: CPT

## 2022-10-20 PROCEDURE — 83690 ASSAY OF LIPASE: CPT

## 2022-10-20 PROCEDURE — 74177 CT ABD & PELVIS W/CONTRAST: CPT | Mod: MA

## 2022-10-20 PROCEDURE — 87086 URINE CULTURE/COLONY COUNT: CPT

## 2022-10-20 PROCEDURE — 87635 SARS-COV-2 COVID-19 AMP PRB: CPT

## 2022-10-20 PROCEDURE — 36415 COLL VENOUS BLD VENIPUNCTURE: CPT

## 2022-10-20 PROCEDURE — 96374 THER/PROPH/DIAG INJ IV PUSH: CPT | Mod: XU

## 2022-10-20 RX ORDER — SODIUM CHLORIDE 9 MG/ML
1000 INJECTION INTRAMUSCULAR; INTRAVENOUS; SUBCUTANEOUS ONCE
Refills: 0 | Status: COMPLETED | OUTPATIENT
Start: 2022-10-20 | End: 2022-10-20

## 2022-10-20 RX ORDER — DIATRIZOATE MEGLUMINE 180 MG/ML
30 INJECTION, SOLUTION INTRAVESICAL ONCE
Refills: 0 | Status: COMPLETED | OUTPATIENT
Start: 2022-10-20 | End: 2022-10-20

## 2022-10-20 RX ORDER — ONDANSETRON 8 MG/1
4 TABLET, FILM COATED ORAL ONCE
Refills: 0 | Status: COMPLETED | OUTPATIENT
Start: 2022-10-20 | End: 2022-10-20

## 2022-10-20 RX ORDER — ACETAMINOPHEN 500 MG
1000 TABLET ORAL ONCE
Refills: 0 | Status: COMPLETED | OUTPATIENT
Start: 2022-10-20 | End: 2022-10-20

## 2022-10-20 RX ADMIN — ONDANSETRON 4 MILLIGRAM(S): 8 TABLET, FILM COATED ORAL at 11:57

## 2022-10-20 RX ADMIN — Medication 400 MILLIGRAM(S): at 11:56

## 2022-10-20 RX ADMIN — SODIUM CHLORIDE 1000 MILLILITER(S): 9 INJECTION INTRAMUSCULAR; INTRAVENOUS; SUBCUTANEOUS at 11:57

## 2022-10-20 RX ADMIN — DIATRIZOATE MEGLUMINE 30 MILLILITER(S): 180 INJECTION, SOLUTION INTRAVESICAL at 11:57

## 2022-10-20 NOTE — CONSULT NOTE ADULT - ASSESSMENT
This is a 77-yo female with breast cancer S/P lumpectomy/radiation (no adjuvant treatment), GERD, and HLD presenting with abdominal pain for 1 day.    Assessment: Possibly pain related to urolithiasis although CT scan shows stone contralateral to area of pain. She is doing well without intervention and has no abnormalities on laboratory studies otherwise.     - PO Challenge and discharge home with close follow-up.   - Follow-up with Dr. Howell in the office tomorrow, 10/21/22 at 12:00pm.     Phone (general inquiries): [phone]  Address: [address] [place] [address]

## 2022-10-20 NOTE — ED PROVIDER NOTE - OBJECTIVE STATEMENT
76 yo F with pmh of L breast ca s/p XRT, HLD, kidney stones, psh of cholecystectomy c/o RLQ pain since 430 this morning. Associated with nausea. Denies fever, chills, vomiting, diarrhea, dysuria, hematuria, back pain.

## 2022-10-20 NOTE — ED PROVIDER NOTE - CARE PROVIDER_API CALL
Blayne Hoewll)  Surgery  [address] [place] [address], [address]  [place] [address]  Phone: [phone]  Fax: [phone]  Follow Up Time:

## 2022-10-20 NOTE — ED PROVIDER NOTE - CLINICAL SUMMARY MEDICAL DECISION MAKING FREE TEXT BOX
78 yo F with pmh of L breast ca s/p XRT, HLD, kidney stones, psh of cholecystectomy c/o RLQ pain since 430 this morning. Associated with nausea. Denies fever, chills, vomiting, diarrhea, dysuria, hematuria, back pain. VSS. +RLQ ttp, no rebound or guarding.

## 2022-10-20 NOTE — ED PROVIDER NOTE - ATTENDING APP SHARED VISIT CONTRIBUTION OF CARE
Pt is a 78yo f, h/o breast ca s/p rt, hld, cholecystectomy, who p/w rlq pain since 4a today. + nausea, no vomiting, fever, diarrhea, hematuria, dysuria, back pain. AVSS. PE as above. + rlq tenderness, no g/r. No cvat. UA + for 5-10 wbcs w/ epithelial cels. WBC wnl. CT a/p neg for appy, + non-obstructing renal stones. ED evaluation and management discussed with the patient in detail.  Close PMD follow up encouraged.  Strict ED return instructions discussed in detail and patient given the opportunity to ask any questions about their discharge diagnosis and instructions. Patient verbalized understanding.

## 2022-10-20 NOTE — CONSULT NOTE ADULT - SUBJECTIVE AND OBJECTIVE BOX
ID: This is a 77-yo female with breast cancer S/P lumpectomy/radiation (no adjuvant treatment), GERD, and HLD presenting with abdominal pain for 1 day.    HPI:   She says that a sharp quality abdominal pain in the right lower quadrant that developed over the past twelve hours brought her to the hospital. She denies N/V, and is passing gas and had a normal BM within 24 hours. Abdominal pain completely resolved as of this afternoon and by the time of my conversation with her. She has never had similar pain but is worried about appendicitis. She had a colonoscopy 5 years ago that showed polyps and was recommended for surveillance 5 years later (due currently). She has had a cholecystectomy 2 years ago.     - Prior work-up in ED: labs without major abnormalities (WBC 8), CT A/P without acute pathology but there was a non-obsturcting left urolithiasis.  - PMH: breast cancer S/P lumpectomy/radiation (no adjuvant treatment), GERD, and HLD  - PSH: lap cholecystectomy 2 years ago.       PAST MEDICAL & SURGICAL HISTORY:  Hypercholesteremia      Breast cancer  left s/p RT 10/2021      History of cholecystectomy      History of tonsillectomy and adenoidectomy      History of ankle surgery  right      S/P anal fissurectomy      H/O arthroscopy of knee  leftbrest lumpectomy      History of lumpectomy of left breast            REVIEW OF SYSTEMS    General: no F/C  Neuro: No seizures, focal neurologic symptoms  Psych: No mood changes  CV: No CP, SOB  Pulm: No SOB, coughing  GI: No N/V or current abdominal pain. No diarrhea.   : No dysuria  Heme: No weakness, easy bruising.  Skin: No rashes  Lymph: No swelling    MEDICATIONS  (STANDING):    MEDICATIONS  (PRN):      Allergies    Toradol (Rash)    Intolerances        SOCIAL HISTORY:    FAMILY HISTORY:      Vital Signs Last 24 Hrs  T(C): 36.7 (20 Oct 2022 14:14), Max: 36.7 (20 Oct 2022 11:26)  T(F): 98 (20 Oct 2022 14:14), Max: 98.1 (20 Oct 2022 11:26)  HR: 70 (20 Oct 2022 14:14) (70 - 98)  BP: 155/76 (20 Oct 2022 14:14) (155/76 - 164/96)  BP(mean): --  RR: 18 (20 Oct 2022 14:14) (18 - 18)  SpO2: 98% (20 Oct 2022 14:14) (96% - 98%)    Parameters below as of 20 Oct 2022 14:14  Patient On (Oxygen Delivery Method): room air        PHYSICAL EXAMINATION    Gen: Well-appearing 76yo female in NAD  Neurologic: AAOx3; moving all extremities  CV: Normal rate, regular rhythm  Pulm: Breathing comfortably  Abd: Soft, non-distended; No TTP throughout.   Incision: Well healed port site scars.  : No Brock  Skin: No rashes  Extremities: No edema.  Psychiatric: Interacting normally    LABS:                        13.0   8.04  )-----------( 335      ( 20 Oct 2022 11:59 )             40.0     10-20    139  |  104  |  17  ----------------------------<  116<H>  4.1   |  23  |  0.76    Ca    9.3      20 Oct 2022 11:59    TPro  7.5  /  Alb  4.8  /  TBili  0.5  /  DBili  x   /  AST  26  /  ALT  33  /  AlkPhos  125<H>  10-20      Urinalysis Basic - ( 20 Oct 2022 12:57 )    Color: Yellow / Appearance: Hazy / S.010 / pH: x  Gluc: x / Ketone: NEGATIVE  / Bili: Negative / Urobili: 0.2 E.U./dL   Blood: x / Protein: NEGATIVE mg/dL / Nitrite: NEGATIVE   Leuk Esterase: Small / RBC: < 5 /HPF / WBC 5-10 /HPF   Sq Epi: x / Non Sq Epi: 5-10 /HPF / Bacteria: None /HPF        RADIOLOGY & ADDITIONAL STUDIES:  ***

## 2022-10-20 NOTE — ED PROVIDER NOTE - NSFOLLOWUPINSTRUCTIONS_ED_ALL_ED_FT
Abdominal Pain    Many things can cause abdominal pain. Many times, abdominal pain is not caused by a disease and will improve without treatment. Your health care provider will do a physical exam to determine if there is a dangerous cause of your pain; blood tests and imaging may help determine the cause of your pain. However, in many cases, no cause may be found and you may need further testing as an outpatient. Monitor your abdominal pain for any changes.     SEEK IMMEDIATE MEDICAL CARE IF YOU HAVE ANY OF THE FOLLOWING SYMPTOMS: worsening abdominal pain, uncontrollable vomiting, profuse diarrhea, inability to have bowel movements or pass gas, black or bloody stools, fever accompanying chest pain or back pain, or fainting. These symptoms may represent a serious problem that is an emergency. Do not wait to see if the symptoms will go away. Get medical help right away. Call 911 and do not drive yourself to the hospital. Follow up with Dr. Howell tomorrow at 12pm [phone]    Abdominal Pain    Many things can cause abdominal pain. Many times, abdominal pain is not caused by a disease and will improve without treatment. Your health care provider will do a physical exam to determine if there is a dangerous cause of your pain; blood tests and imaging may help determine the cause of your pain. However, in many cases, no cause may be found and you may need further testing as an outpatient. Monitor your abdominal pain for any changes.     SEEK IMMEDIATE MEDICAL CARE IF YOU HAVE ANY OF THE FOLLOWING SYMPTOMS: worsening abdominal pain, uncontrollable vomiting, profuse diarrhea, inability to have bowel movements or pass gas, black or bloody stools, fever accompanying chest pain or back pain, or fainting. These symptoms may represent a serious problem that is an emergency. Do not wait to see if the symptoms will go away. Get medical help right away. Call 911 and do not drive yourself to the hospital.

## 2022-10-20 NOTE — ED PROVIDER NOTE - NS ED ATTENDING STATEMENT MOD
This was a shared visit with the BONNIE. I reviewed and verified the documentation and independently performed the documented:

## 2022-10-20 NOTE — ED PROVIDER NOTE - PHYSICAL EXAMINATION
CONSTITUTIONAL: Well-appearing;  in no apparent distress.   HEAD: Normocephalic; atraumatic.   EYES: PERRL; EOM intact; conjunctiva and sclera clear  ENT: normal nose; no rhinorrhea; normal pharynx with no erythema or lesions.   NECK: Supple; non-tender; no LAD  CARDIOVASCULAR: Normal S1, S2; No audible murmurs. Regular rate and rhythm.   RESPIRATORY: Breathing easily; breath sounds clear and equal bilaterally; no wheezes, rhonchi, or rales.  GI: Soft; non-distended; +RLQ ttp   MSK: FROM at all extremities, normal tone   EXT: No cyanosis or edema; N/V intact  SKIN: Normal for age and race; warm; dry; good turgor; no apparent lesions or rash.   NEURO: A & O x 3; face symmetric; grossly unremarkable.   PSYCHOLOGICAL: The patient’s mood and manner are appropriate.

## 2022-10-20 NOTE — ED ADULT NURSE NOTE - OBJECTIVE STATEMENT
pt is a 76 y/o F arriving to ED for c/o abd pain. PMH breast CA, HLD PSH L lumpectomy (2021), s/p anal fissurectomy, R ankle replacement, cholecystectomy. pt states upon waking this morning ~0430, started having pain to RLQ abd that is constant, radiates to upper abd, worse w/ palpation, associated w nausea. denies vomiting, fever, diarrhea, chills, cough, cp or sob.

## 2022-10-20 NOTE — ED PROVIDER NOTE - PATIENT PORTAL LINK FT
You can access the FollowMyHealth Patient Portal offered by [facility] by registering at the following website: [email or web address]. By joining [facility]’s FollowMyHealth portal, you will also be able to view your health information using other applications (apps) compatible with our system.

## 2022-10-22 LAB
CULTURE RESULTS: SIGNIFICANT CHANGE UP
SPECIMEN SOURCE: SIGNIFICANT CHANGE UP

## 2023-01-12 NOTE — PATIENT PROFILE ADULT - FALL HARM RISK - DEVICES
Sarecycline Pregnancy And Lactation Text: This medication is Pregnancy Category D and not consider safe during pregnancy. It is also excreted in breast milk. Other

## 2023-01-23 ENCOUNTER — OUTPATIENT (OUTPATIENT)
Dept: OUTPATIENT SERVICES | Facility: HOSPITAL | Age: 78
LOS: 1 days | End: 2023-01-23
Payer: MEDICARE

## 2023-01-23 DIAGNOSIS — Z90.49 ACQUIRED ABSENCE OF OTHER SPECIFIED PARTS OF DIGESTIVE TRACT: Chronic | ICD-10-CM

## 2023-01-23 DIAGNOSIS — Z98.890 OTHER SPECIFIED POSTPROCEDURAL STATES: Chronic | ICD-10-CM

## 2023-01-23 DIAGNOSIS — Z90.89 ACQUIRED ABSENCE OF OTHER ORGANS: Chronic | ICD-10-CM

## 2023-01-23 PROCEDURE — 73610 X-RAY EXAM OF ANKLE: CPT | Mod: 26,RT

## 2023-01-23 PROCEDURE — 73610 X-RAY EXAM OF ANKLE: CPT

## 2023-02-06 ENCOUNTER — TRANSCRIPTION ENCOUNTER (OUTPATIENT)
Age: 78
End: 2023-02-06

## 2023-05-08 ENCOUNTER — OUTPATIENT (OUTPATIENT)
Dept: OUTPATIENT SERVICES | Facility: HOSPITAL | Age: 78
LOS: 1 days | End: 2023-05-08
Payer: MEDICARE

## 2023-05-08 DIAGNOSIS — Z98.890 OTHER SPECIFIED POSTPROCEDURAL STATES: Chronic | ICD-10-CM

## 2023-05-08 DIAGNOSIS — Z90.89 ACQUIRED ABSENCE OF OTHER ORGANS: Chronic | ICD-10-CM

## 2023-05-08 DIAGNOSIS — Z90.49 ACQUIRED ABSENCE OF OTHER SPECIFIED PARTS OF DIGESTIVE TRACT: Chronic | ICD-10-CM

## 2023-05-08 PROCEDURE — 73610 X-RAY EXAM OF ANKLE: CPT

## 2023-05-08 PROCEDURE — 73610 X-RAY EXAM OF ANKLE: CPT | Mod: 26,RT

## 2024-01-08 ENCOUNTER — OUTPATIENT (OUTPATIENT)
Dept: OUTPATIENT SERVICES | Facility: HOSPITAL | Age: 79
LOS: 1 days | End: 2024-01-08
Payer: MEDICARE

## 2024-01-08 DIAGNOSIS — Z98.890 OTHER SPECIFIED POSTPROCEDURAL STATES: Chronic | ICD-10-CM

## 2024-01-08 DIAGNOSIS — Z90.49 ACQUIRED ABSENCE OF OTHER SPECIFIED PARTS OF DIGESTIVE TRACT: Chronic | ICD-10-CM

## 2024-01-08 DIAGNOSIS — Z90.89 ACQUIRED ABSENCE OF OTHER ORGANS: Chronic | ICD-10-CM

## 2024-01-08 PROCEDURE — 73610 X-RAY EXAM OF ANKLE: CPT

## 2024-01-08 PROCEDURE — 73610 X-RAY EXAM OF ANKLE: CPT | Mod: 26,RT

## 2025-01-30 ENCOUNTER — APPOINTMENT (OUTPATIENT)
Dept: ORTHOPEDIC SURGERY | Facility: CLINIC | Age: 80
End: 2025-01-30
Payer: MEDICARE

## 2025-01-30 ENCOUNTER — NON-APPOINTMENT (OUTPATIENT)
Age: 80
End: 2025-01-30

## 2025-01-30 VITALS
OXYGEN SATURATION: 100 % | HEIGHT: 63 IN | WEIGHT: 124 LBS | HEART RATE: 102 BPM | DIASTOLIC BLOOD PRESSURE: 81 MMHG | SYSTOLIC BLOOD PRESSURE: 158 MMHG | BODY MASS INDEX: 21.97 KG/M2

## 2025-01-30 DIAGNOSIS — M48.061 SPINAL STENOSIS, LUMBAR REGION WITHOUT NEUROGENIC CLAUDICATION: ICD-10-CM

## 2025-01-30 PROCEDURE — 99202 OFFICE O/P NEW SF 15 MIN: CPT | Mod: 57

## 2025-01-31 PROBLEM — M48.061 SPINAL STENOSIS OF LUMBAR REGION, UNSPECIFIED WHETHER NEUROGENIC CLAUDICATION PRESENT: Status: ACTIVE | Noted: 2025-01-31

## 2025-01-31 RX ORDER — ROSUVASTATIN CALCIUM 40 MG/1
40 TABLET, FILM COATED ORAL
Refills: 0 | Status: ACTIVE | COMMUNITY

## 2025-01-31 RX ORDER — ESOMEPRAZOLE MAGNESIUM 40 MG/1
CAPSULE, DELAYED RELEASE ORAL
Refills: 0 | Status: ACTIVE | COMMUNITY

## 2025-01-31 RX ORDER — LOSARTAN POTASSIUM 100 MG/1
TABLET, FILM COATED ORAL
Refills: 0 | Status: ACTIVE | COMMUNITY

## 2025-02-10 RX ORDER — POVIDONE-IODINE 7.5 %
1 SOLUTION, NON-ORAL TOPICAL ONCE
Refills: 0 | Status: COMPLETED | OUTPATIENT
Start: 2025-02-12 | End: 2025-02-12

## 2025-02-10 NOTE — H&P ADULT - HISTORY OF PRESENT ILLNESS
80yoF with low back pain x     Presents today for elective L3-L4 laminectomy, posterior spinal fusion and transforaminal lumbar interbody fusion with Dr. Johnson. 80yoF with low back pain x 2 months. Pt reports waking up on December 8th with "stabbing" pain in her lower back radiating to her right buttock. She reports that the pain radiates down the anterior aspect of her right thigh and travels across her lower back. She reports + N/T of her right shin. Pt has taken oxycodone, tramadol, tylenol, and aleve for her symptoms without relief. She ambulates with crutches due to severe pain. Denies DVT hx ; denies CP, SOB, N/V, tactile fevers, calf pain.     Presents today for elective L3-L4 laminectomy, posterior spinal fusion and transforaminal lumbar interbody fusion with Dr. Johnson.

## 2025-02-10 NOTE — H&P ADULT - PROBLEM SELECTOR PLAN 1
Admit to Orthopaedic Service.  Presents today for elective L3-L4 laminectomy, PSF, TLIF with Dr. Johnson  Pt medically stable and cleared for procedure today by Dr. Davison

## 2025-02-10 NOTE — H&P ADULT - NSHPPHYSICALEXAM_GEN_ALL_CORE
Gen: NAD  MSK: Decreased lumbar spine ROM secondary to pain      Remainder of PE per MD clearance Gen: NAD  MSK: Decreased lumbar spine ROM secondary to pain  Skin warm and well perfused, no visible wounds/erythema/ecchymoses  EHL/FHL/TA/GS 5/5 motor strength bilateral lower extremities   SLT in tact to distal bilateral lower extremities , slightly diminished right shin compared to left   DP pulses palpable bilateral lower extremities   Remainder of exam per medical clearance note

## 2025-02-10 NOTE — H&P ADULT - NSHPLABSRESULTS_GEN_ALL_CORE
Preop CBC, BMP, PT/INR, PTT within normal range and reviewed per medical clearance  H/H: 13.0/40.2  Cr: 0.76  UA: trace blood, no nitrites or leuks  Preop EKG within normal limits and reviewed per medical clearance  3M: DOS  T + S: DOS

## 2025-02-11 VITALS
HEART RATE: 88 BPM | TEMPERATURE: 98 F | HEIGHT: 63 IN | RESPIRATION RATE: 16 BRPM | SYSTOLIC BLOOD PRESSURE: 166 MMHG | OXYGEN SATURATION: 99 % | DIASTOLIC BLOOD PRESSURE: 89 MMHG | WEIGHT: 123.46 LBS

## 2025-02-11 RX ORDER — ROSUVASTATIN CALCIUM 20 MG/1
1 TABLET, FILM COATED ORAL
Refills: 0 | DISCHARGE

## 2025-02-11 RX ADMIN — OXYCODONE HYDROCHLORIDE 5 MILLIGRAM(S): 30 TABLET ORAL at 22:07

## 2025-02-11 NOTE — PATIENT PROFILE ADULT - FALL HARM RISK - HARM RISK INTERVENTIONS

## 2025-02-11 NOTE — PATIENT PROFILE ADULT - STATED REASON FOR ADMISSION
L3-L4 laminectomy, L3-L-4 laminectomy posterior spinal fusion and transforaminal lumbar interbody fusion

## 2025-02-12 ENCOUNTER — INPATIENT (INPATIENT)
Facility: HOSPITAL | Age: 80
LOS: 7 days | Discharge: HOME HEALTH | DRG: 402 | End: 2025-02-20
Payer: MEDICARE

## 2025-02-12 ENCOUNTER — APPOINTMENT (OUTPATIENT)
Dept: ORTHOPEDIC SURGERY | Facility: HOSPITAL | Age: 80
End: 2025-02-12

## 2025-02-12 DIAGNOSIS — Z98.890 OTHER SPECIFIED POSTPROCEDURAL STATES: Chronic | ICD-10-CM

## 2025-02-12 DIAGNOSIS — E78.00 PURE HYPERCHOLESTEROLEMIA, UNSPECIFIED: ICD-10-CM

## 2025-02-12 DIAGNOSIS — I10 ESSENTIAL (PRIMARY) HYPERTENSION: ICD-10-CM

## 2025-02-12 DIAGNOSIS — Z90.49 ACQUIRED ABSENCE OF OTHER SPECIFIED PARTS OF DIGESTIVE TRACT: Chronic | ICD-10-CM

## 2025-02-12 DIAGNOSIS — M48.061 SPINAL STENOSIS, LUMBAR REGION WITHOUT NEUROGENIC CLAUDICATION: ICD-10-CM

## 2025-02-12 DIAGNOSIS — Z90.89 ACQUIRED ABSENCE OF OTHER ORGANS: Chronic | ICD-10-CM

## 2025-02-12 LAB
BLD GP AB SCN SERPL QL: NEGATIVE — SIGNIFICANT CHANGE UP
RH IG SCN BLD-IMP: POSITIVE — SIGNIFICANT CHANGE UP

## 2025-02-12 PROCEDURE — 22633 ARTHRD CMBN 1NTRSPC LUMBAR: CPT | Mod: AS

## 2025-02-12 PROCEDURE — 22853 INSJ BIOMECHANICAL DEVICE: CPT | Mod: AS

## 2025-02-12 PROCEDURE — 63052 LAM FACETC/FRMT ARTHRD LUM 1: CPT | Mod: AS,59

## 2025-02-12 PROCEDURE — 22840 INSERT SPINE FIXATION DEVICE: CPT

## 2025-02-12 PROCEDURE — 22633 ARTHRD CMBN 1NTRSPC LUMBAR: CPT | Mod: 59

## 2025-02-12 PROCEDURE — 22853 INSJ BIOMECHANICAL DEVICE: CPT

## 2025-02-12 PROCEDURE — 63052 LAM FACETC/FRMT ARTHRD LUM 1: CPT | Mod: 59

## 2025-02-12 PROCEDURE — 63047 LAM FACETEC & FORAMOT LUMBAR: CPT | Mod: 59

## 2025-02-12 PROCEDURE — 22840 INSERT SPINE FIXATION DEVICE: CPT | Mod: AS

## 2025-02-12 PROCEDURE — 63047 LAM FACETEC & FORAMOT LUMBAR: CPT | Mod: AS,59

## 2025-02-12 DEVICE — IMPLANTABLE DEVICE: Type: IMPLANTABLE DEVICE | Status: FUNCTIONAL

## 2025-02-12 DEVICE — CONN SFX TITAN MED SZ A6 CROSS: Type: IMPLANTABLE DEVICE | Status: FUNCTIONAL

## 2025-02-12 DEVICE — SURGIFOAM PAD 8CM X 12.5CM X 10MM (100): Type: IMPLANTABLE DEVICE | Status: FUNCTIONAL

## 2025-02-12 DEVICE — SURGIFLO HEMOSTATIC MATRIX KIT: Type: IMPLANTABLE DEVICE | Status: FUNCTIONAL

## 2025-02-12 DEVICE — SET SCREW SPINE T27 5.5/6.0MM: Type: IMPLANTABLE DEVICE | Status: FUNCTIONAL

## 2025-02-12 DEVICE — GRAFT BONE INFUSE KIT MED: Type: IMPLANTABLE DEVICE | Status: FUNCTIONAL

## 2025-02-12 DEVICE — CMT BN SPINE CONFIDENCE 11ML: Type: IMPLANTABLE DEVICE | Status: FUNCTIONAL

## 2025-02-12 DEVICE — SPACER SABLE 8 DEG 10X22X6-12MM: Type: IMPLANTABLE DEVICE | Status: FUNCTIONAL

## 2025-02-12 RX ORDER — HYDROMORPHONE/SOD CHLOR,ISO/PF 2 MG/10 ML
0.5 SYRINGE (ML) INJECTION
Refills: 0 | Status: DISCONTINUED | OUTPATIENT
Start: 2025-02-12 | End: 2025-02-12

## 2025-02-12 RX ORDER — LOSARTAN POTASSIUM 100 MG/1
1 TABLET, FILM COATED ORAL
Refills: 0 | DISCHARGE

## 2025-02-12 RX ORDER — SODIUM CHLORIDE 9 G/1000ML
1000 INJECTION, SOLUTION INTRAVENOUS
Refills: 0 | Status: DISCONTINUED | OUTPATIENT
Start: 2025-02-12 | End: 2025-02-20

## 2025-02-12 RX ORDER — ROSUVASTATIN CALCIUM 20 MG/1
40 TABLET, FILM COATED ORAL AT BEDTIME
Refills: 0 | Status: DISCONTINUED | OUTPATIENT
Start: 2025-02-12 | End: 2025-02-20

## 2025-02-12 RX ORDER — LOSARTAN POTASSIUM 100 MG/1
50 TABLET, FILM COATED ORAL DAILY
Refills: 0 | Status: DISCONTINUED | OUTPATIENT
Start: 2025-02-12 | End: 2025-02-12

## 2025-02-12 RX ORDER — ACETAMINOPHEN 500 MG/5ML
1000 LIQUID (ML) ORAL EVERY 8 HOURS
Refills: 0 | Status: DISCONTINUED | OUTPATIENT
Start: 2025-02-13 | End: 2025-02-20

## 2025-02-12 RX ORDER — BUPIVACAINE 13.3 MG/ML
20 INJECTION, SUSPENSION, LIPOSOMAL INFILTRATION ONCE
Refills: 0 | Status: DISCONTINUED | OUTPATIENT
Start: 2025-02-12 | End: 2025-02-20

## 2025-02-12 RX ORDER — EZETIMIBE 10 MG/1
1 TABLET ORAL
Refills: 0 | DISCHARGE

## 2025-02-12 RX ORDER — LOSARTAN POTASSIUM 100 MG/1
50 TABLET, FILM COATED ORAL DAILY
Refills: 0 | Status: DISCONTINUED | OUTPATIENT
Start: 2025-02-13 | End: 2025-02-20

## 2025-02-12 RX ORDER — ACETAMINOPHEN 500 MG/5ML
1000 LIQUID (ML) ORAL ONCE
Refills: 0 | Status: COMPLETED | OUTPATIENT
Start: 2025-02-12 | End: 2025-02-12

## 2025-02-12 RX ORDER — PREGABALIN 50 MG/1
50 CAPSULE ORAL THREE TIMES A DAY
Refills: 0 | Status: DISCONTINUED | OUTPATIENT
Start: 2025-02-12 | End: 2025-02-18

## 2025-02-12 RX ORDER — EZETIMIBE 10 MG/1
10 TABLET ORAL DAILY
Refills: 0 | Status: DISCONTINUED | OUTPATIENT
Start: 2025-02-12 | End: 2025-02-20

## 2025-02-12 RX ORDER — HYDROMORPHONE/SOD CHLOR,ISO/PF 2 MG/10 ML
0.5 SYRINGE (ML) INJECTION EVERY 4 HOURS
Refills: 0 | Status: DISCONTINUED | OUTPATIENT
Start: 2025-02-12 | End: 2025-02-12

## 2025-02-12 RX ORDER — SENNA 187 MG
2 TABLET ORAL AT BEDTIME
Refills: 0 | Status: DISCONTINUED | OUTPATIENT
Start: 2025-02-12 | End: 2025-02-20

## 2025-02-12 RX ORDER — METOCLOPRAMIDE HCL 10 MG
10 TABLET ORAL EVERY 8 HOURS
Refills: 0 | Status: DISCONTINUED | OUTPATIENT
Start: 2025-02-12 | End: 2025-02-20

## 2025-02-12 RX ORDER — OXYCODONE HYDROCHLORIDE 30 MG/1
5 TABLET ORAL EVERY 4 HOURS
Refills: 0 | Status: DISCONTINUED | OUTPATIENT
Start: 2025-02-12 | End: 2025-02-16

## 2025-02-12 RX ORDER — ACETAMINOPHEN 500 MG/5ML
1000 LIQUID (ML) ORAL EVERY 8 HOURS
Refills: 0 | Status: DISCONTINUED | OUTPATIENT
Start: 2025-02-12 | End: 2025-02-12

## 2025-02-12 RX ORDER — APREPITANT 40 MG/1
40 CAPSULE ORAL ONCE
Refills: 0 | Status: COMPLETED | OUTPATIENT
Start: 2025-02-12 | End: 2025-02-12

## 2025-02-12 RX ORDER — CEFAZOLIN SODIUM IN 0.9 % NACL 3 G/100 ML
2000 INTRAVENOUS SOLUTION, PIGGYBACK (ML) INTRAVENOUS EVERY 8 HOURS
Refills: 0 | Status: COMPLETED | OUTPATIENT
Start: 2025-02-12 | End: 2025-02-13

## 2025-02-12 RX ORDER — SUCRALFATE 1 G
2 TABLET ORAL ONCE
Refills: 0 | Status: DISCONTINUED | OUTPATIENT
Start: 2025-02-12 | End: 2025-02-20

## 2025-02-12 RX ORDER — METHOCARBAMOL 500 MG/1
500 TABLET, FILM COATED ORAL EVERY 8 HOURS
Refills: 0 | Status: DISCONTINUED | OUTPATIENT
Start: 2025-02-12 | End: 2025-02-20

## 2025-02-12 RX ORDER — POLYETHYLENE GLYCOL 3350 17 G/17G
17 POWDER, FOR SOLUTION ORAL DAILY
Refills: 0 | Status: DISCONTINUED | OUTPATIENT
Start: 2025-02-12 | End: 2025-02-20

## 2025-02-12 RX ORDER — ONDANSETRON HCL/PF 4 MG/2 ML
4 VIAL (ML) INJECTION EVERY 6 HOURS
Refills: 0 | Status: DISCONTINUED | OUTPATIENT
Start: 2025-02-12 | End: 2025-02-20

## 2025-02-12 RX ADMIN — Medication 1 LOZENGE: at 15:58

## 2025-02-12 RX ADMIN — METHOCARBAMOL 500 MILLIGRAM(S): 500 TABLET, FILM COATED ORAL at 22:16

## 2025-02-12 RX ADMIN — Medication 1000 MILLIGRAM(S): at 16:21

## 2025-02-12 RX ADMIN — ROSUVASTATIN CALCIUM 40 MILLIGRAM(S): 20 TABLET, FILM COATED ORAL at 22:16

## 2025-02-12 RX ADMIN — APREPITANT 40 MILLIGRAM(S): 40 CAPSULE ORAL at 11:11

## 2025-02-12 RX ADMIN — Medication 1 APPLICATION(S): at 11:12

## 2025-02-12 RX ADMIN — Medication 1 APPLICATION(S): at 11:11

## 2025-02-12 RX ADMIN — Medication 4 MILLIGRAM(S): at 18:32

## 2025-02-12 RX ADMIN — Medication 400 MILLIGRAM(S): at 16:01

## 2025-02-12 RX ADMIN — OXYCODONE HYDROCHLORIDE 5 MILLIGRAM(S): 30 TABLET ORAL at 21:07

## 2025-02-12 RX ADMIN — Medication 10 MILLIGRAM(S): at 21:13

## 2025-02-12 RX ADMIN — Medication 1000 MILLIGRAM(S): at 11:11

## 2025-02-12 RX ADMIN — PREGABALIN 50 MILLIGRAM(S): 50 CAPSULE ORAL at 22:16

## 2025-02-12 RX ADMIN — Medication 100 MILLIGRAM(S): at 21:13

## 2025-02-12 NOTE — PHYSICAL THERAPY INITIAL EVALUATION ADULT - ADDITIONAL COMMENTS
Pt states she lives alone in elevator building. pt has axillary crutches she has been using to amb. Pt was independent with ADLs PTA.

## 2025-02-12 NOTE — PHYSICAL THERAPY INITIAL EVALUATION ADULT - PERTINENT HX OF CURRENT PROBLEM, REHAB EVAL
80yoF with low back pain x 2 months. Pt reports waking up on December 8th with "stabbing" pain in her lower back radiating to her right buttock. She reports that the pain radiates down the anterior aspect of her right thigh and travels across her lower back. She reports + N/T of her right shin. Pt has taken oxycodone, tramadol, tylenol, and aleve for her symptoms without relief. She ambulates with crutches due to severe pain. Denies DVT hx ; denies CP, SOB, N/V, tactile fevers, calf pain.     Presents today for elective L3-L4 laminectomy, posterior spinal fusion and transforaminal lumbar interbody fusion with Dr. Johnson.

## 2025-02-12 NOTE — PRE-ANESTHESIA EVALUATION ADULT - NSANTHOSAYNRD_GEN_A_CORE
No. ZAIRE screening performed.  STOP BANG Legend: 0-2 = LOW Risk; 3-4 = INTERMEDIATE Risk; 5-8 = HIGH Risk

## 2025-02-12 NOTE — PHYSICAL THERAPY INITIAL EVALUATION ADULT - GENERAL OBSERVATIONS, REHAB EVAL
Pt received semi supine in bed +hep lock +tele +hemovac +SCD. Pt is POD #0 Fusion, spine, lumbar, TLIF, 1-2 levels  tolerated session well amb 40 ft with RW CGA. pt was left as received in NAD .

## 2025-02-12 NOTE — PHYSICAL THERAPY INITIAL EVALUATION ADULT - DID THE PATIENT HAVE SURGERY?
Renal Note    Reason for Consult- YADIRA    Subjective  Seen and examined. Labs/chart reviewed. Patient with worsening hemodynamics. CRRT discontinued this AM with plan for withdrawal of care.         Objective:    Current Medications:    Current Facility-Administered Medications   Medication Dose Route Frequency Provider Last Rate Last Admin   • LORazepam (ATIVAN) injection 2 mg  2 mg Intravenous Q1H PRN Dagmar Bryan CNP       • morphine injection 2 mg  2 mg Intravenous Q15 Min PRN Dagmar Bryan CNP       • ondansetron (ZOFRAN) injection 4 mg  4 mg Intravenous Q6H PRN Dagmar Bryan CNP       • [Held by provider] busPIRone (BUSPAR) tablet 5 mg  5 mg Oral 2 times per day Aster Pineda CNP   5 mg at 02/17/23 2020   • [Held by provider] folic acid (FOLATE) tablet 1 mg  1 mg Oral Daily Aster Pineda CNP   1 mg at 02/17/23 0910          Physical Exam:    Blood pressure 108/80, pulse 98, temperature 96.1 °F (35.6 °C), temperature source Tympanic, resp. rate (!) 26, height 6' (1.829 m), weight 86.6 kg (190 lb 14.7 oz), SpO2 (!) 73 %.       Intake/Output Summary (Last 24 hours) at 2/19/2023 1028  Last data filed at 2/19/2023 0859  Gross per 24 hour   Intake 2058.95 ml   Output 7327.63 ml   Net -5268.68 ml       Gen: Oriented X 3, NAD  Skin: no rashes, no lesions  HEENT: anicteric, neck supple  Neck: supple, no JVD, trachea midline  Chest: CTAB, No wheezing, no crackles  CV: S1S2 nml, RRR, no murmur, gallop, rub  Abd: Soft NT/ND, no HSM, +BS normoactive  Ext: No Edema  Neuro: No focal deficit  Access:    Labs:   Recent Labs     02/18/23 2000 02/19/23  0211 02/19/23  0444   SODIUM 134* 136 143   POTASSIUM 4.8 4.9 5.5*   CO2 16* 18* 22   ANIONGAP 25* 27* 28*   GLUCOSE 144* 123* 84   BUN 71* 54* 49*   CREATININE 4.03* 3.29* 3.09*   BCRAT 18 16 16   CALCIUM 8.7 9.2 8.1*   BILIRUBIN 7.0* 8.1* 6.9*   AST 14,621* 12,634* 11,391*   GPT 2,621* 2,875* 2,507*   ALKPT 38* 47 41*   GLOB 4.0 4.6* 3.9   AGR 0.8* 0.8* 0.8*         Recent Labs     02/18/23  0531 02/18/23  1013 02/19/23  0444   WBC 17.8* 19.5* 16.9*   RBC 4.09* 5.13 4.86   HGB 13.5 17.0 16.1   HCT 42.7 51.2* 50.4    222 217   .4* 99.8 103.7*   MCH 33.0 33.1 33.1   MCHC 31.6* 33.2 31.9*   NRBCRE 1* 1* 14*   ASEG  --   --  13.4*   ALYMP  --   --  2.7   AMONO  --   --  0.8   AEO  --   --  0.0   ABAS  --   --  0.0   PMOR Normal  --   --         Imaging:    LAST MRI:  No results found for this or any previous visit.    LAST CT:  === 02/16/23 ===    CTA CHEST PULMONARY EMBOLISM    - Narrative -  CTA CHEST PULMONARY EMBOLISM : 2/17/2023 7:46 AM    HISTORY: New onset of atrial fibrillation; dizziness.    COMPARISON: None.    TECHNIQUE:  CT pulmonary angiogram with 100 mL Omnipaque 350 IV contrast.  Multiplanar  reformats and MIPS were generated on scanner. No separate workstation.    FINDINGS:    Pulmonary arteries: There is adequate opacification with no evidence for  acute pulmonary embolism.    Thoracic aorta: No abnormality involving the thoracic aorta or arch  vessels, given the exam was not tailored for evaluation of these vessels.    Heart: Mild cardiomegaly with enlarged right heart chambers and contrast  reflux into the hepatic veins consistent with right heart dysfunction.  Severe multivessel coronary artery calcifications detected.    Pericardium: No fluid or thickening.    Lymph nodes: No evidence for pathologic axillary, mediastinal, or hilar  lymphadenopathy.    Esophagus: Normal.    Pleura and lung parenchyma: Moderate background emphysema.  Mild dependent  atelectasis in both lungs, otherwise clear.  Trace left pleural effusion.    Osseous structures and soft tissues: No osseous lytic or blastic lesion. No  acute traumatic finding. Age-appropriate degenerative changes seen.    Cephalad abdomen: Mild ascites.    - Impression -  1.  No evidence for pulmonary embolism or acute lung pathology.  2.  Multivessel coronary disease with small pleural  effusions.  3.  Mild ascites.    Electronically Signed by: ALFREDO RAMOS MD  Signed on: 2/17/2023 8:50 AM    ___________________________________________________________________________    CT ABDOMEN PELVIS WO CONTRAST    - Narrative -  EXAM: CT ABDOMEN PELVIS WO CONTRAST    CLINICAL INDICATION: Renal parenchymal disease with hematuria, acute renal  failure    COMPARISON:  None.    TECHNIQUE: CT abdomen pelvis without contrast mA and/or kVp was adjusted  for patient size.    FINDINGS: Lung bases are unremarkable.  Mild spondylosis of the spine is  present.  Moderate cardiomegaly is present.  Multivessel coronary artery  calcification is seen.  No calcified gallstones are present.  Small  cystlike density seen within the right lobe of the liver inferiorly.  The  spleen, pancreas and adrenal glands are unremarkable.  No renal calculi or  hydronephrosis is seen.  Abdominal aortic wall calcification is evident.    Bladder is closely Abdullahi catheter.  Colonic diverticulosis is present.  No  bowel obstruction is present.  Small fat-containing umbilical hernia is  present.  Prostate gland is seen.  There is mild ascites.  No free air.  The appendix not visualized.    - Impression -  1.    No renal calculi or hydronephrosis is present.  The bladder is  collapsed with a Abdullahi catheter.  2.    Mild ascites.  3.    Moderate cardiomegaly with multivessel coronary artery calcification.    Electronically Signed by: GAMA GEORGE D.O.  Signed on: 2/16/2023 8:07 PM    LAST X-RAY:  === 02/16/23 ===    XR CHEST AP OR PA    - Narrative -  HISTORY:left IJ trialysis catheter placement. 212.08 lb    Single AP    portable view of the chest    comparison: February 16, 2023    - Impression -  Impression:  Current examination is rotated relative to prior. Cardiomediastinal  silhouette is moderately enlarged. There is interval placement of left IJ  triple lumen catheter to the SVC. Lung volumes are low from poor  inspiration. No lobar  consolidation pleural effusion or pneumothorax.    Remainder of the exam is otherwise stable.      Electronically Signed by: LATASHA FREDERICK MD  Signed on: 2/17/2023 12:16 AM    ___________________________________________________________________________    XR CHEST AP OR PA    - Narrative -  PROCEDURE: XR CHEST AP OR PA    HISTORY: Chest Pain    TECHNIQUE: One view of the chest obtained in frontal projection.    COMPARISON: None    FINDINGS:    Low lung volumes make detailed cardiopulmonary assessment difficult.    Lungs, Airways, & Pleurae: No airspace consolidation. No large pleural  effusion or pneumothorax.    Heart & Mediastinum: Normal heart size.    Medical Devices: None.    Other Findings: Degenerative changes affect the spine.    - Impression -  Low lung volumes without evident acute cardiopulmonary abnormality.    Electronically Signed by: DAVON CRAWLEY M.D.  Signed on: 2/16/2023 3:52 PM    LAST U/S:  No results found for this or any previous visit.      ASSESSMENT/PLAN    YADIRA  - YADIRA likely secondary to cardiorenal syndrome/renal edema.  It is possible that he has underlying CKD from hypertension, the extent of which is unknown at this point.  CT abdomen did not show any hydronephrosis  - patient is nonoliguric; off Lasix GTT  - Started on CRRT/ CVVHDF on 2/17 with fluid removal -- > CRRT discontinued this AM    Hyperkalemia/anion gap metabolic acidosis  - Secondary to low GFR  -Expect improvement with improving urine output   -Lisinopril on hold  - Initially on CRRT, now off  - Monitor    Likely cardiogenic shock  - On pressors and CRRT  - S/p Echocardiogram     New onset atrial fibrillation  -Continues to be tachycardic  -On amiodarone gtt.  -heparin gtt. for anticoagulation    Hematuria  -CT abdomen does not show any anatomical lesions that could explain the hematuria.  This can be evaluated once patient is stable    Hyperphosphatemia  - Secondary to low GFR  - Monitor       Critical care time spent in  evaluation, management and discussion of team-35 minutes  Thank you for allowing me to participate in the care. Will follow closely with you.       Fusion, spine, lumbar, TLIF, 1-2 levels/yes

## 2025-02-13 ENCOUNTER — TRANSCRIPTION ENCOUNTER (OUTPATIENT)
Age: 80
End: 2025-02-13

## 2025-02-13 LAB
ANION GAP SERPL CALC-SCNC: 14 MMOL/L — SIGNIFICANT CHANGE UP (ref 5–17)
BASOPHILS # BLD AUTO: 0.03 K/UL — SIGNIFICANT CHANGE UP (ref 0–0.2)
BASOPHILS NFR BLD AUTO: 0.2 % — SIGNIFICANT CHANGE UP (ref 0–2)
BUN SERPL-MCNC: 10 MG/DL — SIGNIFICANT CHANGE UP (ref 7–23)
CALCIUM SERPL-MCNC: 8.6 MG/DL — SIGNIFICANT CHANGE UP (ref 8.4–10.5)
CHLORIDE SERPL-SCNC: 103 MMOL/L — SIGNIFICANT CHANGE UP (ref 96–108)
CO2 SERPL-SCNC: 21 MMOL/L — LOW (ref 22–31)
CREAT SERPL-MCNC: 0.52 MG/DL — SIGNIFICANT CHANGE UP (ref 0.5–1.3)
EGFR: 93 ML/MIN/1.73M2 — SIGNIFICANT CHANGE UP
EOSINOPHIL # BLD AUTO: 0.02 K/UL — SIGNIFICANT CHANGE UP (ref 0–0.5)
EOSINOPHIL NFR BLD AUTO: 0.2 % — SIGNIFICANT CHANGE UP (ref 0–6)
GLUCOSE SERPL-MCNC: 96 MG/DL — SIGNIFICANT CHANGE UP (ref 70–99)
HCT VFR BLD CALC: 28.4 % — LOW (ref 34.5–45)
HGB BLD-MCNC: 8.9 G/DL — LOW (ref 11.5–15.5)
IMM GRANULOCYTES NFR BLD AUTO: 0.5 % — SIGNIFICANT CHANGE UP (ref 0–0.9)
LYMPHOCYTES # BLD AUTO: 18.1 % — SIGNIFICANT CHANGE UP (ref 13–44)
LYMPHOCYTES # BLD AUTO: 2.38 K/UL — SIGNIFICANT CHANGE UP (ref 1–3.3)
MCHC RBC-ENTMCNC: 28.7 PG — SIGNIFICANT CHANGE UP (ref 27–34)
MCHC RBC-ENTMCNC: 31.3 G/DL — LOW (ref 32–36)
MCV RBC AUTO: 91.6 FL — SIGNIFICANT CHANGE UP (ref 80–100)
MONOCYTES # BLD AUTO: 0.71 K/UL — SIGNIFICANT CHANGE UP (ref 0–0.9)
MONOCYTES NFR BLD AUTO: 5.4 % — SIGNIFICANT CHANGE UP (ref 2–14)
NEUTROPHILS # BLD AUTO: 9.95 K/UL — HIGH (ref 1.8–7.4)
NEUTROPHILS NFR BLD AUTO: 75.6 % — SIGNIFICANT CHANGE UP (ref 43–77)
NRBC BLD AUTO-RTO: 0 /100 WBCS — SIGNIFICANT CHANGE UP (ref 0–0)
PLATELET # BLD AUTO: 479 K/UL — HIGH (ref 150–400)
POTASSIUM SERPL-MCNC: 3.6 MMOL/L — SIGNIFICANT CHANGE UP (ref 3.5–5.3)
POTASSIUM SERPL-SCNC: 3.6 MMOL/L — SIGNIFICANT CHANGE UP (ref 3.5–5.3)
RBC # BLD: 3.1 M/UL — LOW (ref 3.8–5.2)
RBC # FLD: 12.9 % — SIGNIFICANT CHANGE UP (ref 10.3–14.5)
SODIUM SERPL-SCNC: 138 MMOL/L — SIGNIFICANT CHANGE UP (ref 135–145)
WBC # BLD: 13.16 K/UL — HIGH (ref 3.8–10.5)
WBC # FLD AUTO: 13.16 K/UL — HIGH (ref 3.8–10.5)

## 2025-02-13 PROCEDURE — 99221 1ST HOSP IP/OBS SF/LOW 40: CPT

## 2025-02-13 RX ORDER — ENOXAPARIN SODIUM 100 MG/ML
40 INJECTION SUBCUTANEOUS EVERY 24 HOURS
Refills: 0 | Status: DISCONTINUED | OUTPATIENT
Start: 2025-02-14 | End: 2025-02-20

## 2025-02-13 RX ADMIN — Medication 1000 MILLIGRAM(S): at 07:29

## 2025-02-13 RX ADMIN — OXYCODONE HYDROCHLORIDE 5 MILLIGRAM(S): 30 TABLET ORAL at 02:08

## 2025-02-13 RX ADMIN — Medication 2 TABLET(S): at 21:43

## 2025-02-13 RX ADMIN — METHOCARBAMOL 500 MILLIGRAM(S): 500 TABLET, FILM COATED ORAL at 05:20

## 2025-02-13 RX ADMIN — OXYCODONE HYDROCHLORIDE 5 MILLIGRAM(S): 30 TABLET ORAL at 17:29

## 2025-02-13 RX ADMIN — PREGABALIN 50 MILLIGRAM(S): 50 CAPSULE ORAL at 21:42

## 2025-02-13 RX ADMIN — Medication 1000 MILLIGRAM(S): at 00:02

## 2025-02-13 RX ADMIN — OXYCODONE HYDROCHLORIDE 5 MILLIGRAM(S): 30 TABLET ORAL at 11:45

## 2025-02-13 RX ADMIN — OXYCODONE HYDROCHLORIDE 5 MILLIGRAM(S): 30 TABLET ORAL at 20:14

## 2025-02-13 RX ADMIN — OXYCODONE HYDROCHLORIDE 5 MILLIGRAM(S): 30 TABLET ORAL at 21:14

## 2025-02-13 RX ADMIN — OXYCODONE HYDROCHLORIDE 5 MILLIGRAM(S): 30 TABLET ORAL at 06:20

## 2025-02-13 RX ADMIN — Medication 20 MILLIEQUIVALENT(S): at 17:52

## 2025-02-13 RX ADMIN — OXYCODONE HYDROCHLORIDE 5 MILLIGRAM(S): 30 TABLET ORAL at 01:08

## 2025-02-13 RX ADMIN — Medication 1000 MILLIGRAM(S): at 00:59

## 2025-02-13 RX ADMIN — OXYCODONE HYDROCHLORIDE 5 MILLIGRAM(S): 30 TABLET ORAL at 05:20

## 2025-02-13 RX ADMIN — ROSUVASTATIN CALCIUM 40 MILLIGRAM(S): 20 TABLET, FILM COATED ORAL at 21:42

## 2025-02-13 RX ADMIN — PREGABALIN 50 MILLIGRAM(S): 50 CAPSULE ORAL at 13:59

## 2025-02-13 RX ADMIN — POLYETHYLENE GLYCOL 3350 17 GRAM(S): 17 POWDER, FOR SOLUTION ORAL at 13:59

## 2025-02-13 RX ADMIN — Medication 1000 MILLIGRAM(S): at 16:08

## 2025-02-13 RX ADMIN — Medication 100 MILLIGRAM(S): at 05:20

## 2025-02-13 RX ADMIN — OXYCODONE HYDROCHLORIDE 5 MILLIGRAM(S): 30 TABLET ORAL at 16:29

## 2025-02-13 RX ADMIN — METHOCARBAMOL 500 MILLIGRAM(S): 500 TABLET, FILM COATED ORAL at 13:59

## 2025-02-13 RX ADMIN — PREGABALIN 50 MILLIGRAM(S): 50 CAPSULE ORAL at 06:09

## 2025-02-13 RX ADMIN — Medication 40 MILLIGRAM(S): at 05:51

## 2025-02-13 RX ADMIN — METHOCARBAMOL 500 MILLIGRAM(S): 500 TABLET, FILM COATED ORAL at 21:42

## 2025-02-13 RX ADMIN — OXYCODONE HYDROCHLORIDE 5 MILLIGRAM(S): 30 TABLET ORAL at 10:45

## 2025-02-13 NOTE — PROGRESS NOTE ADULT - SUBJECTIVE AND OBJECTIVE BOX
Ortho Note    Pt comfortable without complaints, pain controlled  Denies CP, SOB, N/V, numbness/tingling     Vital Signs Last 24 Hrs  T(C): 36.4 (02-13-25 @ 04:44), Max: 36.4 (02-13-25 @ 04:44)  T(F): 97.5 (02-13-25 @ 04:44), Max: 97.5 (02-13-25 @ 04:44)  HR: 72 (02-13-25 @ 04:44) (72 - 72)  BP: 142/74 (02-13-25 @ 04:44) (142/74 - 142/74)  BP(mean): --  RR: 16 (02-13-25 @ 04:44) (16 - 16)  SpO2: 97% (02-13-25 @ 04:44) (97% - 97%)  I&O's Summary    12 Feb 2025 07:01  -  13 Feb 2025 07:00  --------------------------------------------------------  IN: 720 mL / OUT: 390 mL / NET: 330 mL        General: Pt Alert and oriented, NAD  DSG C/D/I, HVx1  Pulses: 2+ DP/PT b/l  Sensation: SILT b/l  Motor: Quad/Ham/EHL/FHL/TA/GS  5/5            A/P: 80yFemale POD#1 s/p L4-5 laminectomy, PSF, TLIF and L3-4 laminectomy   - Stable  - Pain Control  - DVT ppx: scds  - Post op abx: ancef  - PT, WBS:  wbat  - dispo: HPT pend clearance / drain removal     Ortho Pager [phone]

## 2025-02-13 NOTE — PROGRESS NOTE ADULT - SUBJECTIVE AND OBJECTIVE BOX
Ortho Note    Pt seen and examined. Comfortable without complaints, pain controlled  Denies CP, SOB, N/V, numbness/tingling. States left leg symptoms from pre-op are improving.    Vital Signs Last 24 Hrs  T(C): 36.9 (02-13-25 @ 09:01), Max: 36.9 (02-13-25 @ 09:01)  T(F): 98.5 (02-13-25 @ 09:01), Max: 98.5 (02-13-25 @ 09:01)  HR: 83 (02-13-25 @ 09:01) (83 - 83)  BP: 161/88 (02-13-25 @ 09:01) (161/88 - 161/88)  BP(mean): --  RR: 16 (02-13-25 @ 09:01) (16 - 16)  SpO2: 96% (02-13-25 @ 09:01) (96% - 96%)  AVSS    General: Pt Alert and oriented, NAD  DSG- gauze/ paper tape C/D/I, HV x 1 in place  Pulses: +2DP, WWP feet  Sensation: SILT BLE  Motor: 5/5 EHL/FHL/TA/GS BLE                          8.9    13.16 )-----------( 479      ( 13 Feb 2025 05:30 )             28.4     02-13    138  |  103  |  10  ----------------------------<  96  3.6   |  21[L]  |  0.52    Ca    8.6      13 Feb 2025 05:30        A/P: 80yFemale POD#1 s/p L4-5 laminectomy, PSF, TLIF and L3-4 laminectomy   - VSS, Labs WNL- appreciate medicine co-management  - Pain Control  - DVT ppx: SCDs, ASA to resume post-op day 5  - PT, WBS: WBAT  - OOB for meals, I/S  - bowel regimen  - monitor HV x 1  - dispo: home with HPT pending PT clearance and drain removal    Ortho Pager [phone]

## 2025-02-13 NOTE — DISCHARGE NOTE NURSING/CASE MANAGEMENT/SOCIAL WORK - PATIENT PORTAL LINK FT
You can access the FollowMyHealth Patient Portal offered by [facility] by registering at the following website: [email or web address]. By joining [facility]’s FollowMyHealth portal, you will also be able to view your health information using other applications (apps) compatible with our system.

## 2025-02-13 NOTE — DISCHARGE NOTE PROVIDER - NSDCMRMEDTOKEN_GEN_ALL_CORE_FT
acetaminophen 325 mg oral tablet: 2 tab(s) orally every 6 hours  aspirin 325 mg oral delayed release tablet: 1 tab(s) orally 2 times a day  CeleBREX 200 mg oral capsule: 1 cap(s) orally 2 times a day MDD:2  Crestor 40 mg oral tablet: 1 tab(s) orally once a day  esomeprazole:   Ezetimibe:   ezetimibe 10 mg oral tablet: 1 tab(s) orally once a day  famotidine 40 mg oral tablet: 1 tab(s) orally once a day  losartan 25 mg oral tablet: 1 tab(s) orally once a day  losartan 50 mg oral tablet: 1 tab(s) orally once a day  polyethylene glycol 3350 oral powder for reconstitution: 17 gram(s) orally once a day (at bedtime)  simvastatin 40 mg oral tablet: 1 tab(s) orally once a day (at bedtime)  sucralfate 1 g oral tablet: 2 tab(s) orally once a day (at bedtime)  Ultram 50 mg oral tablet: 0.5 tab(s) orally every 6 hours, As needed, Moderate Pain (4 - 6)  Zofran 4 mg oral tablet: 1 tab(s) orally   acetaminophen 500 mg oral tablet: 2 tab(s) orally every 8 hours  aspirin 325 mg oral delayed release tablet: 1 tab(s) orally 2 times a day  Crestor 40 mg oral tablet: 1 tab(s) orally once a day  esomeprazole: 1 tab(s) orally once a day with breakfast  ezetimibe 10 mg oral tablet: 1 tab(s) orally once a day  losartan 50 mg oral tablet: 1 tab(s) orally once a day  methocarbamol 500 mg oral tablet: 1 tab(s) orally every 8 hours as needed for  muscle spasm MDD: 3  oxyCODONE 5 mg oral tablet: 1 tab(s) orally every 4 hours as needed for Severe Pain (7 - 10) MDD: 6  pregabalin 50 mg oral capsule: 1 cap(s) orally 3 times a day MDD: 3  sucralfate 1 g oral tablet: 2 tab(s) orally once a day (at bedtime)  Zofran 4 mg oral tablet: 1 tab(s) orally every 6 hours as needed for  nausea   acetaminophen 500 mg oral tablet: 2 tab(s) orally every 8 hours  acyclovir 400 mg oral tablet: 1 tab(s) orally 3 times a day MDD: 3  Crestor 40 mg oral tablet: 1 tab(s) orally once a day  esomeprazole: 1 tab(s) orally once a day with breakfast  ezetimibe 10 mg oral tablet: 1 tab(s) orally once a day  losartan 50 mg oral tablet: 1 tab(s) orally once a day  methocarbamol 500 mg oral tablet: 1 tab(s) orally every 8 hours as needed for  muscle spasm MDD: 3  oxyCODONE 5 mg oral tablet: 1 tab(s) orally every 4 hours as needed for Severe Pain (7 - 10) MDD: 6  pregabalin 50 mg oral capsule: 1 cap(s) orally 3 times a day MDD: 3  sucralfate 1 g oral tablet: 2 tab(s) orally once a day (at bedtime)  Zofran 4 mg oral tablet: 1 tab(s) orally every 6 hours as needed for  nausea

## 2025-02-13 NOTE — OCCUPATIONAL THERAPY INITIAL EVALUATION ADULT - GENERAL OBSERVATIONS, REHAB EVAL
OT IE complete. LAMONT Wesley clearing pt. for session. Pt. received semi-supine in bed, HV, +heplock in NAD, agreeable to therapy session.

## 2025-02-13 NOTE — CONSULT NOTE ADULT - ASSESSMENT
Ms. Grier is a 79 yo F with a PMH of chronic lower back pain, lumbar spinal stenosis, HLD, and HTN who presented for an elective L3-L4 laminectomy, PSF, TLIF which was completed on 2/12 without any complications. Medicine is consulted for comanagement.     #Lumbar spinal stenosis   #s/p L3-L4 laminectomy, PSF, TLIF  -pain management per ortho recs, tylenol, pregabalin, PRN oxycodone, PRN IV dilaudid for breakthrough pain   -bowel regimen with scheduled senna and miralax   -encourage working with PT, incentive spirometer   -DVT ppx per ortho team recs     #HLD   -continue home rosuvastatin 40 mg daily     #HTN   -continue home losartan 50 mg daily     #Reactive leukocytosis   -likely related to recent surgery, no current concern for active infection   -continue to trend WBC to ensure improves     #Acute blood loss anemia  -likely mixture of phlebotomy and recent surgery   -continue to trend to ensure stable, transfuse for hemoglobin of 7 or lower

## 2025-02-13 NOTE — DISCHARGE NOTE PROVIDER - HOSPITAL COURSE
Admitted 2/12/25  Surgery- L4-5 laminectomy, PSF, TLIF and L3-4 laminectomy   Ani-op Antibiotics  Pain control  DVT prophylaxis  OOB/Physical Therapy     Admitted 2/12/25  Surgery- L4-5 laminectomy, PSF, TLIF and L3-4 laminectomy   Ani-op Antibiotics  Pain control  DVT prophylaxis  OOB/Physical Therapy           Admitted 2/12/25  Surgery:  L4-5 laminectomy, PSF, TLIF and L3-4 laminectomy   Ani-op Antibiotics  Pain control  DVT prophylaxis  OOB/Physical Therapy  Medical Co Management

## 2025-02-13 NOTE — DISCHARGE NOTE PROVIDER - NSDCCPCAREPLAN_GEN_ALL_CORE_FT
PRINCIPAL DISCHARGE DIAGNOSIS  Diagnosis: Lumbar stenosis  Assessment and Plan of Treatment: L4-5 laminectomy, PSF, TLIF and L3-4 laminectomy

## 2025-02-13 NOTE — DISCHARGE NOTE PROVIDER - NSDCCPTREATMENT_GEN_ALL_CORE_FT
PRINCIPAL PROCEDURE  Procedure: Fusion, spine, lumbar, TLIF, 1-2 levels  Findings and Treatment: lumbar stenosis

## 2025-02-13 NOTE — DISCHARGE NOTE NURSING/CASE MANAGEMENT/SOCIAL WORK - NSDCPEFALRISK_GEN_ALL_CORE
For information on Fall & Injury Prevention, visit: [email or web address] OR  [email or web address] OR  https://www.cdc.gov/steadi/patient.html

## 2025-02-13 NOTE — DISCHARGE NOTE PROVIDER - CARE PROVIDER_API CALL
Moose Johnson  Spine Surgery  [address]  [place] [address]  Phone: [phone]  Fax: [phone]  Follow Up Time: 2 weeks

## 2025-02-13 NOTE — OCCUPATIONAL THERAPY INITIAL EVALUATION ADULT - PERTINENT HX OF CURRENT PROBLEM, REHAB EVAL
80yoF with low back pain x 2 months. Pt reports waking up on December 8th with "stabbing" pain in her lower back radiating to her right buttock. She reports that the pain radiates down the anterior aspect of her right thigh and travels across her lower back. She reports + N/T of her right shin. Pt has taken oxycodone, tramadol, tylenol, and aleve for her symptoms without relief. She ambulates with crutches due to severe pain. Denies DVT hx ; denies CP, SOB, N/V, tactile fevers, calf pain.

## 2025-02-13 NOTE — DISCHARGE NOTE NURSING/CASE MANAGEMENT/SOCIAL WORK - FINANCIAL ASSISTANCE
[facility] provides services at a reduced cost to those who are determined to be eligible through [facility]’s financial assistance program. Information regarding [facility]’s financial assistance program can be found by going to [email or web address] or by calling [phone].

## 2025-02-13 NOTE — DISCHARGE NOTE PROVIDER - NSDCFUADDINST_GEN_ALL_CORE_FT
ACTIVITY:     - No extreme bending, extending, turning, twisting, or straining. No strenuous activity, heavy lifting, driving or returning to work until cleared by your surgeon.           DRESSING/SHOWERING:      (PRINEO – mesh with glue)     -May shower post-op day 1, pat dry afterwards. Dressing is water-resistant. Do not soak in bathtubs. Do not need to cover with another dressing. Do not remove mesh dressing – it will be taken care of in your follow up appointment. Do not apply ointments, creams or oils to incision area.     (STAPLES/SUTURES/STERI-STRIPS)     -Change dressing daily until post-op day 5. Sponge bathe until post-op day 5 then may take full shower. Once dressing removed keep incision clean and dry. Do not pick at your incision. Do not apply creams, ointments or oils to your incision until cleared by your surgeon. Do not soak your incision in sitting water (ie tubs, pools, lakes, etc.) until cleared by your surgeon.            MEDICATION/ANTICOAGULATION:     - You have been prescribed medications for pain:       - Tylenol (Acetaminophen) for mild to moderate pain. Do not exceed 3,000mg daily.       - For more severe pain, you may continue to take the Tylenol with the addition of narcotic pain medication. Take this medication as prescribed. Do not take more than prescribed. Note that this medication may cause drowsiness or dizziness. Do not operate machinery. This medication may cause constipation.     - If you have been prescribed a muscle relaxer, take this medication as needed for muscle spasm. Follow instructions on bottle.     - Try to have regular bowel movements. Take stool softener or laxative if necessary. You may wish to take Miralax daily until you have regular bowel movements.      - Do not take antiinflammatories (Aleve, Advil, Naproxen, Ibuprofen, etc.) until cleared by your surgeon. Tylenol is not an anti-inflammatory and okay to take (see above).     - If you have a pain management physician, please follow-up with them postoperatively.      - If you experience any negative side effects of your medications, please call your surgeon's office to discuss.           FOLLOW UP:     - Call to schedule an appt with Dr. Johnson for follow up.      - Please follow-up with your primary care physician or any other specialist you see postoperatively, if needed.      - Contact your doctor or go to the emergency room if you experience: fever greater than 101.5, chills, chest pain, difficulty breathing, redness or excessive drainage around the incision, other concerns.   ACTIVITY:   - No extreme bending, extending, turning, twisting, or straining. No strenuous activity, heavy lifting, driving or returning to work until cleared by your surgeon.    DRESSING/SHOWERING:    (PRINEO – mesh with glue)   -May shower post-op day 1, pat dry afterwards. Dressing is water-resistant. Do not soak in bathtubs. Do not need to cover with another dressing. Do not remove mesh dressing – it will be taken care of in your follow up appointment. Do not apply ointments, creams or oils to incision area.      MEDICATION/ANTICOAGULATION:   - You have been prescribed medications for pain:     - Tylenol (Acetaminophen) for mild to moderate pain. Do not exceed 3,000mg daily.     - For more severe pain, you may continue to take the Tylenol with the addition of narcotic pain medication. Take this medication as prescribed. Do not take more than prescribed. Note that this medication may cause drowsiness or dizziness. Do not operate machinery. This medication may cause constipation.     - If you have been prescribed a muscle relaxer, take this medication as needed for muscle spasm. Follow instructions on bottle.     - Try to have regular bowel movements. Take stool softener or laxative if necessary. You may wish to take Miralax daily until you have regular bowel movements.      - Do not take antiinflammatories (Aleve, Advil, Naproxen, Ibuprofen, etc.) until cleared by your surgeon. Tylenol is not an anti-inflammatory and okay to take (see above).     - If you have a pain management physician, please follow-up with them postoperatively.      - If you experience any negative side effects of your medications, please call your surgeon's office to discuss.           FOLLOW UP:     - Call to schedule an appt with Dr. Johnson for follow up.      - Please follow-up with your primary care physician or any other specialist you see postoperatively, if needed.      - Contact your doctor or go to the emergency room if you experience: fever greater than 101.5, chills, chest pain, difficulty breathing, redness or excessive drainage around the incision, other concerns. ACTIVITY:   - No extreme bending, extending, turning, twisting, or straining. No strenuous activity, heavy lifting, driving or returning to work until cleared by your surgeon.    DRESSING/SHOWERING:    (PRINEO – mesh with glue)   -May shower post-op day 1, pat dry afterwards. Dressing is water-resistant. Do not soak in bathtubs. Do not need to cover with another dressing. Do not remove mesh dressing – it will be taken care of in your follow up appointment. Do not apply ointments, creams or oils to incision area.      MEDICATION/ANTICOAGULATION:   - You have been prescribed medications for pain:     - Tylenol (Acetaminophen) for mild to moderate pain. Do not exceed 3,000mg daily.     - For more severe pain, you may continue to take the Tylenol with the addition of narcotic pain medication. Take this medication as prescribed. Do not take more than prescribed. Note that this medication may cause drowsiness or dizziness. Do not operate machinery. This medication may cause constipation.     - If you have been prescribed a muscle relaxer, take this medication as needed for muscle spasm. Follow instructions on bottle.     - Try to have regular bowel movements. Take stool softener or laxative if necessary. You may wish to take Miralax daily until you have regular bowel movements.      - Do not take antiinflammatories (Aleve, Advil, Naproxen, Ibuprofen, etc.) until cleared by your surgeon. Tylenol is not an anti-inflammatory and okay to take (see above).     - If you have a pain management physician, please follow-up with them postoperatively.      - If you experience any negative side effects of your medications, please call your surgeon's office to discuss.           FOLLOW UP:     - Call to schedule an appt with Dr. Johnson for follow up.      - Please follow-up with your primary care physician or any other specialist you see postoperatively, if needed.      - Contact your doctor or go to the emergency room if you experience: fever greater than 101.5, chills, chest pain, difficulty breathing, redness or excessive drainage around the incision, other concerns.    Please continue your acyclovir regimen for 5 days duration.  ACTIVITY:   - No extreme bending, extending, turning, twisting, or straining. No strenuous activity, heavy lifting, driving or returning to work until cleared by your surgeon.    DRESSING/SHOWERING:  Aquacel   Dressing is water-resistant. Do not soak in bathtubs. Do not need to cover with another dressing. If you have a mesh dressing underneath aquacel, Do not remove mesh dressing – it will be taken care of in your follow up appointment. Do not apply ointments, creams or oils to incision area. Keep incision clean and dry at all times.      MEDICATION/ANTICOAGULATION:   - You have been prescribed medications for pain:     - Tylenol (Acetaminophen) for mild to moderate pain. Do not exceed 3,000mg daily.     - For more severe pain, you may continue to take the Tylenol with the addition of narcotic pain medication. Take this medication as prescribed. Do not take more than prescribed. Note that this medication may cause drowsiness or dizziness. Do not operate machinery. This medication may cause constipation.     - If you have been prescribed a muscle relaxer, take this medication as needed for muscle spasm. Follow instructions on bottle.     - Try to have regular bowel movements. Take stool softener or laxative if necessary. You may wish to take Miralax daily until you have regular bowel movements.      - Do not take antiinflammatories (Aleve, Advil, Naproxen, Ibuprofen, etc.) until cleared by your surgeon. Tylenol is not an anti-inflammatory and okay to take (see above).     - If you have a pain management physician, please follow-up with them postoperatively.      - If you experience any negative side effects of your medications, please call your surgeon's office to discuss.           FOLLOW UP:     - Call to schedule an appt with Dr. Johnson for follow up.      - Please follow-up with your primary care physician or any other specialist you see postoperatively, if needed.      - Contact your doctor or go to the emergency room if you experience: fever greater than 101.5, chills, chest pain, difficulty breathing, redness or excessive drainage around the incision, other concerns.    Please continue your acyclovir regimen for 5 days duration.

## 2025-02-13 NOTE — CONSULT NOTE ADULT - SUBJECTIVE AND OBJECTIVE BOX
HPI:     Ms. Grier is a 81 yo F with a PMH of chronic lower back pain, lumbar spinal stenosis, HLD, and HTN who presented for an elective L3-L4 laminectomy, PSF, TLIF which was completed on 2/12 without any complications. Seen at bedside this morning, having manageable pain which is improved compared to her pain prior to surgery. Able to eat a full breakfast, no nausea or bloating this morning. No new symptoms or concerns.       MEDICATIONS  (STANDING):  acetaminophen     Tablet .. 1000 milliGRAM(s) Oral every 8 hours  BUpivacaine liposome 1.3% Injectable (no eMAR) 20 milliLiter(s) Local Injection once  ezetimibe 10 milliGRAM(s) Oral daily  lactated ringers. 1000 milliLiter(s) (100 mL/Hr) IV Continuous <Continuous>  losartan 50 milliGRAM(s) Oral daily  methocarbamol 500 milliGRAM(s) Oral every 8 hours  ondansetron   Disintegrating Tablet 4 milliGRAM(s) Oral every 6 hours  pantoprazole    Tablet 40 milliGRAM(s) Oral before breakfast  polyethylene glycol 3350 17 Gram(s) Oral daily  potassium chloride    Tablet ER 20 milliEquivalent(s) Oral once  pregabalin 50 milliGRAM(s) Oral three times a day  rosuvastatin 40 milliGRAM(s) Oral at bedtime  senna 2 Tablet(s) Oral at bedtime  sucralfate 2 Gram(s) Oral once    MEDICATIONS  (PRN):  HYDROmorphone  Injectable 0.5 milliGRAM(s) IV Push every 4 hours PRN breakthrough pain  HYDROmorphone  Injectable 0.5 milliGRAM(s) IV Push every 15 minutes PRN breakthrough pain in the PACU  metoclopramide Injectable 10 milliGRAM(s) IV Push every 8 hours PRN Refractory Nausea and/or Vomiting  oxyCODONE    IR 5 milliGRAM(s) Oral every 4 hours PRN Severe Pain (7 - 10)    CAPILLARY BLOOD GLUCOSE        I&O's Summary    12 Feb 2025 07:01  -  13 Feb 2025 07:00  --------------------------------------------------------  IN: 720 mL / OUT: 390 mL / NET: 330 mL    13 Feb 2025 07:01  -  13 Feb 2025 12:21  --------------------------------------------------------  IN: 0 mL / OUT: 250 mL / NET: -250 mL        PHYSICAL EXAM:  Vital Signs Last 24 Hrs  T(C): 36.9 (13 Feb 2025 09:01), Max: 36.9 (13 Feb 2025 09:01)  T(F): 98.5 (13 Feb 2025 09:01), Max: 98.5 (13 Feb 2025 09:01)  HR: 83 (13 Feb 2025 09:01) (72 - 88)  BP: 161/88 (13 Feb 2025 09:01) (129/73 - 168/90)  BP(mean): 95 (12 Feb 2025 23:47) (92 - 105)  RR: 16 (13 Feb 2025 09:01) (12 - 18)  SpO2: 96% (13 Feb 2025 09:01) (94% - 97%)    Parameters below as of 13 Feb 2025 09:01  Patient On (Oxygen Delivery Method): room air    EXAM:   Appears comfortable laying flat in bed   MMM  Normal WOB on RA, CTAB   RRR, soft systolic murmur   Abdomen soft, nontender, nondistended  Extremities warm and without edema   AOX3, no focal neuro deficits     LABS:                        8.9    13.16 )-----------( 479      ( 13 Feb 2025 05:30 )             28.4     02-13    138  |  103  |  10  ----------------------------<  96  3.6   |  21[L]  |  0.52    Ca    8.6      13 Feb 2025 05:30            Urinalysis Basic - ( 13 Feb 2025 05:30 )    Color: x / Appearance: x / SG: x / pH: x  Gluc: 96 mg/dL / Ketone: x  / Bili: x / Urobili: x   Blood: x / Protein: x / Nitrite: x   Leuk Esterase: x / RBC: x / WBC x   Sq Epi: x / Non Sq Epi: x / Bacteria: x            RADIOLOGY & ADDITIONAL TESTS:  Imaging from Last 24 Hours:    None new interval

## 2025-02-13 NOTE — OCCUPATIONAL THERAPY INITIAL EVALUATION ADULT - DIAGNOSIS, OT EVAL
Pt. is POD#1 (2/12) s/p L4-5 laminectomy, PSF, TLIF and L3-4 laminectomy. Upon assessment, pt. demo I/MI in ADLs and mobility. No further skilled OT needs. Please f/u if change in status is noted.

## 2025-02-13 NOTE — OCCUPATIONAL THERAPY INITIAL EVALUATION ADULT - ADDITIONAL COMMENTS
pt. resides alone in an elevator accessible apartment where she was I prior in ADLs and mobility. She owns a RW, has a tub/shower with a grab bar

## 2025-02-14 LAB
ANION GAP SERPL CALC-SCNC: 11 MMOL/L — SIGNIFICANT CHANGE UP (ref 5–17)
BASOPHILS # BLD AUTO: 0.04 K/UL — SIGNIFICANT CHANGE UP (ref 0–0.2)
BASOPHILS NFR BLD AUTO: 0.3 % — SIGNIFICANT CHANGE UP (ref 0–2)
BUN SERPL-MCNC: 8 MG/DL — SIGNIFICANT CHANGE UP (ref 7–23)
CALCIUM SERPL-MCNC: 8.9 MG/DL — SIGNIFICANT CHANGE UP (ref 8.4–10.5)
CHLORIDE SERPL-SCNC: 104 MMOL/L — SIGNIFICANT CHANGE UP (ref 96–108)
CO2 SERPL-SCNC: 25 MMOL/L — SIGNIFICANT CHANGE UP (ref 22–31)
CREAT SERPL-MCNC: 0.64 MG/DL — SIGNIFICANT CHANGE UP (ref 0.5–1.3)
EGFR: 89 ML/MIN/1.73M2 — SIGNIFICANT CHANGE UP
EOSINOPHIL # BLD AUTO: 0.37 K/UL — SIGNIFICANT CHANGE UP (ref 0–0.5)
EOSINOPHIL NFR BLD AUTO: 2.9 % — SIGNIFICANT CHANGE UP (ref 0–6)
GLUCOSE SERPL-MCNC: 99 MG/DL — SIGNIFICANT CHANGE UP (ref 70–99)
HCT VFR BLD CALC: 30.7 % — LOW (ref 34.5–45)
HGB BLD-MCNC: 10 G/DL — LOW (ref 11.5–15.5)
IMM GRANULOCYTES NFR BLD AUTO: 0.5 % — SIGNIFICANT CHANGE UP (ref 0–0.9)
LYMPHOCYTES # BLD AUTO: 2.66 K/UL — SIGNIFICANT CHANGE UP (ref 1–3.3)
LYMPHOCYTES # BLD AUTO: 20.9 % — SIGNIFICANT CHANGE UP (ref 13–44)
MCHC RBC-ENTMCNC: 29.8 PG — SIGNIFICANT CHANGE UP (ref 27–34)
MCHC RBC-ENTMCNC: 32.6 G/DL — SIGNIFICANT CHANGE UP (ref 32–36)
MCV RBC AUTO: 91.4 FL — SIGNIFICANT CHANGE UP (ref 80–100)
MONOCYTES # BLD AUTO: 0.82 K/UL — SIGNIFICANT CHANGE UP (ref 0–0.9)
MONOCYTES NFR BLD AUTO: 6.4 % — SIGNIFICANT CHANGE UP (ref 2–14)
NEUTROPHILS # BLD AUTO: 8.77 K/UL — HIGH (ref 1.8–7.4)
NEUTROPHILS NFR BLD AUTO: 69 % — SIGNIFICANT CHANGE UP (ref 43–77)
NRBC BLD AUTO-RTO: 0 /100 WBCS — SIGNIFICANT CHANGE UP (ref 0–0)
PLATELET # BLD AUTO: 507 K/UL — HIGH (ref 150–400)
POTASSIUM SERPL-MCNC: 3.3 MMOL/L — LOW (ref 3.5–5.3)
POTASSIUM SERPL-SCNC: 3.3 MMOL/L — LOW (ref 3.5–5.3)
RBC # BLD: 3.36 M/UL — LOW (ref 3.8–5.2)
RBC # FLD: 13.3 % — SIGNIFICANT CHANGE UP (ref 10.3–14.5)
SODIUM SERPL-SCNC: 140 MMOL/L — SIGNIFICANT CHANGE UP (ref 135–145)
WBC # BLD: 12.73 K/UL — HIGH (ref 3.8–10.5)
WBC # FLD AUTO: 12.73 K/UL — HIGH (ref 3.8–10.5)

## 2025-02-14 PROCEDURE — 99231 SBSQ HOSP IP/OBS SF/LOW 25: CPT

## 2025-02-14 RX ADMIN — Medication 4 MILLIGRAM(S): at 16:25

## 2025-02-14 RX ADMIN — OXYCODONE HYDROCHLORIDE 5 MILLIGRAM(S): 30 TABLET ORAL at 22:28

## 2025-02-14 RX ADMIN — ROSUVASTATIN CALCIUM 40 MILLIGRAM(S): 20 TABLET, FILM COATED ORAL at 21:28

## 2025-02-14 RX ADMIN — ENOXAPARIN SODIUM 40 MILLIGRAM(S): 100 INJECTION SUBCUTANEOUS at 21:27

## 2025-02-14 RX ADMIN — Medication 1000 MILLIGRAM(S): at 16:30

## 2025-02-14 RX ADMIN — OXYCODONE HYDROCHLORIDE 5 MILLIGRAM(S): 30 TABLET ORAL at 13:45

## 2025-02-14 RX ADMIN — Medication 1000 MILLIGRAM(S): at 07:34

## 2025-02-14 RX ADMIN — OXYCODONE HYDROCHLORIDE 5 MILLIGRAM(S): 30 TABLET ORAL at 05:15

## 2025-02-14 RX ADMIN — PREGABALIN 50 MILLIGRAM(S): 50 CAPSULE ORAL at 13:16

## 2025-02-14 RX ADMIN — OXYCODONE HYDROCHLORIDE 5 MILLIGRAM(S): 30 TABLET ORAL at 09:08

## 2025-02-14 RX ADMIN — OXYCODONE HYDROCHLORIDE 5 MILLIGRAM(S): 30 TABLET ORAL at 00:00

## 2025-02-14 RX ADMIN — OXYCODONE HYDROCHLORIDE 5 MILLIGRAM(S): 30 TABLET ORAL at 13:16

## 2025-02-14 RX ADMIN — Medication 1000 MILLIGRAM(S): at 00:12

## 2025-02-14 RX ADMIN — Medication 40 MILLIEQUIVALENT(S): at 11:19

## 2025-02-14 RX ADMIN — LOSARTAN POTASSIUM 50 MILLIGRAM(S): 100 TABLET, FILM COATED ORAL at 06:51

## 2025-02-14 RX ADMIN — OXYCODONE HYDROCHLORIDE 5 MILLIGRAM(S): 30 TABLET ORAL at 17:45

## 2025-02-14 RX ADMIN — METHOCARBAMOL 500 MILLIGRAM(S): 500 TABLET, FILM COATED ORAL at 21:28

## 2025-02-14 RX ADMIN — POLYETHYLENE GLYCOL 3350 17 GRAM(S): 17 POWDER, FOR SOLUTION ORAL at 11:20

## 2025-02-14 RX ADMIN — METHOCARBAMOL 500 MILLIGRAM(S): 500 TABLET, FILM COATED ORAL at 13:15

## 2025-02-14 RX ADMIN — Medication 1000 MILLIGRAM(S): at 16:25

## 2025-02-14 RX ADMIN — Medication 1000 MILLIGRAM(S): at 04:50

## 2025-02-14 RX ADMIN — OXYCODONE HYDROCHLORIDE 5 MILLIGRAM(S): 30 TABLET ORAL at 21:28

## 2025-02-14 RX ADMIN — METHOCARBAMOL 500 MILLIGRAM(S): 500 TABLET, FILM COATED ORAL at 05:26

## 2025-02-14 RX ADMIN — OXYCODONE HYDROCHLORIDE 5 MILLIGRAM(S): 30 TABLET ORAL at 00:12

## 2025-02-14 RX ADMIN — OXYCODONE HYDROCHLORIDE 5 MILLIGRAM(S): 30 TABLET ORAL at 17:27

## 2025-02-14 RX ADMIN — PREGABALIN 50 MILLIGRAM(S): 50 CAPSULE ORAL at 21:28

## 2025-02-14 RX ADMIN — OXYCODONE HYDROCHLORIDE 5 MILLIGRAM(S): 30 TABLET ORAL at 04:15

## 2025-02-14 RX ADMIN — OXYCODONE HYDROCHLORIDE 5 MILLIGRAM(S): 30 TABLET ORAL at 09:30

## 2025-02-14 RX ADMIN — EZETIMIBE 10 MILLIGRAM(S): 10 TABLET ORAL at 11:19

## 2025-02-14 RX ADMIN — PREGABALIN 50 MILLIGRAM(S): 50 CAPSULE ORAL at 05:26

## 2025-02-14 NOTE — PROGRESS NOTE ADULT - SUBJECTIVE AND OBJECTIVE BOX
Medicine Progress Note    Patient is a 80y old  Female who presents with a chief complaint of Low back pain (13 Feb 2025 13:45)      SUBJECTIVE / OVERNIGHT EVENTS:  Feels well this morning overall, has had pain in her back but pain is controlled, she was able to walk in hallway with PT. No new concerns.     MEDICATIONS  (STANDING):  acetaminophen     Tablet .. 1000 milliGRAM(s) Oral every 8 hours  BUpivacaine liposome 1.3% Injectable (no eMAR) 20 milliLiter(s) Local Injection once  enoxaparin Injectable 40 milliGRAM(s) SubCutaneous every 24 hours  ezetimibe 10 milliGRAM(s) Oral daily  lactated ringers. 1000 milliLiter(s) (100 mL/Hr) IV Continuous <Continuous>  losartan 50 milliGRAM(s) Oral daily  methocarbamol 500 milliGRAM(s) Oral every 8 hours  ondansetron   Disintegrating Tablet 4 milliGRAM(s) Oral every 6 hours  pantoprazole    Tablet 40 milliGRAM(s) Oral before breakfast  polyethylene glycol 3350 17 Gram(s) Oral daily  pregabalin 50 milliGRAM(s) Oral three times a day  rosuvastatin 40 milliGRAM(s) Oral at bedtime  senna 2 Tablet(s) Oral at bedtime  sucralfate 2 Gram(s) Oral once    MEDICATIONS  (PRN):  HYDROmorphone  Injectable 0.5 milliGRAM(s) IV Push every 4 hours PRN breakthrough pain  HYDROmorphone  Injectable 0.5 milliGRAM(s) IV Push every 15 minutes PRN breakthrough pain in the PACU  metoclopramide Injectable 10 milliGRAM(s) IV Push every 8 hours PRN Refractory Nausea and/or Vomiting  oxyCODONE    IR 5 milliGRAM(s) Oral every 4 hours PRN Severe Pain (7 - 10)    CAPILLARY BLOOD GLUCOSE        I&O's Summary    13 Feb 2025 07:01  -  14 Feb 2025 07:00  --------------------------------------------------------  IN: 0 mL / OUT: 375 mL / NET: -375 mL    14 Feb 2025 07:01  -  14 Feb 2025 12:27  --------------------------------------------------------  IN: 540 mL / OUT: 710 mL / NET: -170 mL        PHYSICAL EXAM:  Vital Signs Last 24 Hrs  T(C): 37.4 (14 Feb 2025 09:29), Max: 37.6 (13 Feb 2025 15:18)  T(F): 99.3 (14 Feb 2025 09:29), Max: 99.7 (13 Feb 2025 15:18)  HR: 84 (14 Feb 2025 09:29) (80 - 95)  BP: 129/79 (14 Feb 2025 09:29) (123/73 - 163/77)  BP(mean): --  RR: 16 (14 Feb 2025 09:29) (15 - 16)  SpO2: 94% (14 Feb 2025 09:29) (94% - 96%)    Parameters below as of 14 Feb 2025 09:29  Patient On (Oxygen Delivery Method): room air    EXAM:   Appears comfortable   MMM  Normal WOB on RA, CTAB  RRR, soft systolic murmur   Abdomen soft, nontender, nondistended. Drain in place with serosanguinous fluid.   Extremities warm and without edema   AOX3, no focal neuro deficits     LABS:                        10.0   12.73 )-----------( 507      ( 14 Feb 2025 05:30 )             30.7     02-14    140  |  104  |  8   ----------------------------<  99  3.3[L]   |  25  |  0.64    Ca    8.9      14 Feb 2025 05:30            Urinalysis Basic - ( 14 Feb 2025 05:30 )    Color: x / Appearance: x / SG: x / pH: x  Gluc: 99 mg/dL / Ketone: x  / Bili: x / Urobili: x   Blood: x / Protein: x / Nitrite: x   Leuk Esterase: x / RBC: x / WBC x   Sq Epi: x / Non Sq Epi: x / Bacteria: x            RADIOLOGY & ADDITIONAL TESTS:  Imaging from Last 24 Hours:    None new interval

## 2025-02-14 NOTE — PROGRESS NOTE ADULT - ASSESSMENT
Ms. Grier is a 79 yo F with a PMH of chronic lower back pain, lumbar spinal stenosis, HLD, and HTN who presented for an elective L3-L4 laminectomy, PSF, TLIF which was completed on 2/12 without any complications. Medicine is consulted for comanagement.     #Lumbar spinal stenosis   #s/p L3-L4 laminectomy, PSF, TLIF  -pain management per ortho recs, tylenol, pregabalin, PRN oxycodone, PRN IV dilaudid for breakthrough pain   -bowel regimen with scheduled senna and miralax   -encourage working with PT, incentive spirometer   -DVT ppx per ortho team recs, on lovenox 40 mg daily     #HLD   -continue home rosuvastatin 40 mg daily     #HTN   -continue home losartan 50 mg daily     #Reactive leukocytosis   -likely related to recent surgery, no current concern for active infection   -continue to trend WBC to ensure improves     #Acute blood loss anemia  -likely mixture of phlebotomy and recent surgery   -continue to trend to ensure stable, transfuse for hemoglobin of 7 or lower     #Hypokalemia   -replete with oral K today, would give at least 40 mEq

## 2025-02-15 LAB
ANION GAP SERPL CALC-SCNC: 7 MMOL/L — SIGNIFICANT CHANGE UP (ref 5–17)
BASOPHILS # BLD AUTO: 0.04 K/UL — SIGNIFICANT CHANGE UP (ref 0–0.2)
BASOPHILS NFR BLD AUTO: 0.4 % — SIGNIFICANT CHANGE UP (ref 0–2)
BUN SERPL-MCNC: 9 MG/DL — SIGNIFICANT CHANGE UP (ref 7–23)
CALCIUM SERPL-MCNC: 8.6 MG/DL — SIGNIFICANT CHANGE UP (ref 8.4–10.5)
CHLORIDE SERPL-SCNC: 103 MMOL/L — SIGNIFICANT CHANGE UP (ref 96–108)
CO2 SERPL-SCNC: 27 MMOL/L — SIGNIFICANT CHANGE UP (ref 22–31)
CREAT SERPL-MCNC: 0.56 MG/DL — SIGNIFICANT CHANGE UP (ref 0.5–1.3)
EGFR: 92 ML/MIN/1.73M2 — SIGNIFICANT CHANGE UP
EOSINOPHIL # BLD AUTO: 0.27 K/UL — SIGNIFICANT CHANGE UP (ref 0–0.5)
EOSINOPHIL NFR BLD AUTO: 2.7 % — SIGNIFICANT CHANGE UP (ref 0–6)
GLUCOSE SERPL-MCNC: 102 MG/DL — HIGH (ref 70–99)
HCT VFR BLD CALC: 30.2 % — LOW (ref 34.5–45)
HGB BLD-MCNC: 9.5 G/DL — LOW (ref 11.5–15.5)
IMM GRANULOCYTES NFR BLD AUTO: 0.6 % — SIGNIFICANT CHANGE UP (ref 0–0.9)
LYMPHOCYTES # BLD AUTO: 2.63 K/UL — SIGNIFICANT CHANGE UP (ref 1–3.3)
LYMPHOCYTES # BLD AUTO: 26.2 % — SIGNIFICANT CHANGE UP (ref 13–44)
MCHC RBC-ENTMCNC: 28.9 PG — SIGNIFICANT CHANGE UP (ref 27–34)
MCHC RBC-ENTMCNC: 31.5 G/DL — LOW (ref 32–36)
MCV RBC AUTO: 91.8 FL — SIGNIFICANT CHANGE UP (ref 80–100)
MONOCYTES # BLD AUTO: 0.75 K/UL — SIGNIFICANT CHANGE UP (ref 0–0.9)
MONOCYTES NFR BLD AUTO: 7.5 % — SIGNIFICANT CHANGE UP (ref 2–14)
NEUTROPHILS # BLD AUTO: 6.28 K/UL — SIGNIFICANT CHANGE UP (ref 1.8–7.4)
NEUTROPHILS NFR BLD AUTO: 62.6 % — SIGNIFICANT CHANGE UP (ref 43–77)
NRBC BLD AUTO-RTO: 0 /100 WBCS — SIGNIFICANT CHANGE UP (ref 0–0)
PLATELET # BLD AUTO: 452 K/UL — HIGH (ref 150–400)
POTASSIUM SERPL-MCNC: 3.9 MMOL/L — SIGNIFICANT CHANGE UP (ref 3.5–5.3)
POTASSIUM SERPL-SCNC: 3.9 MMOL/L — SIGNIFICANT CHANGE UP (ref 3.5–5.3)
RBC # BLD: 3.29 M/UL — LOW (ref 3.8–5.2)
RBC # FLD: 13.4 % — SIGNIFICANT CHANGE UP (ref 10.3–14.5)
SODIUM SERPL-SCNC: 137 MMOL/L — SIGNIFICANT CHANGE UP (ref 135–145)
WBC # BLD: 10.03 K/UL — SIGNIFICANT CHANGE UP (ref 3.8–10.5)
WBC # FLD AUTO: 10.03 K/UL — SIGNIFICANT CHANGE UP (ref 3.8–10.5)

## 2025-02-15 PROCEDURE — 99233 SBSQ HOSP IP/OBS HIGH 50: CPT

## 2025-02-15 RX ORDER — ESOMEPRAZOLE MAGNESIUM 40 MG/1
1 CAPSULE, DELAYED RELEASE ORAL
Qty: 0 | Refills: 0 | DISCHARGE

## 2025-02-15 RX ORDER — LOSARTAN POTASSIUM 100 MG/1
1 TABLET, FILM COATED ORAL
Refills: 0 | DISCHARGE

## 2025-02-15 RX ORDER — ACETAMINOPHEN 500 MG/5ML
2 LIQUID (ML) ORAL
Qty: 0 | Refills: 0 | DISCHARGE
Start: 2025-02-15

## 2025-02-15 RX ORDER — PREGABALIN 50 MG/1
1 CAPSULE ORAL
Qty: 21 | Refills: 0
Start: 2025-02-15 | End: 2025-02-21

## 2025-02-15 RX ORDER — METHOCARBAMOL 500 MG/1
1 TABLET, FILM COATED ORAL
Qty: 42 | Refills: 0
Start: 2025-02-15 | End: 2025-02-28

## 2025-02-15 RX ORDER — ONDANSETRON HCL/PF 4 MG/2 ML
1 VIAL (ML) INJECTION
Qty: 0 | Refills: 0 | DISCHARGE

## 2025-02-15 RX ORDER — EZETIMIBE 10 MG/1
0 TABLET ORAL
Refills: 0 | DISCHARGE

## 2025-02-15 RX ORDER — OXYCODONE HYDROCHLORIDE 30 MG/1
1 TABLET ORAL
Qty: 30 | Refills: 0
Start: 2025-02-15 | End: 2025-02-19

## 2025-02-15 RX ADMIN — OXYCODONE HYDROCHLORIDE 5 MILLIGRAM(S): 30 TABLET ORAL at 23:35

## 2025-02-15 RX ADMIN — OXYCODONE HYDROCHLORIDE 5 MILLIGRAM(S): 30 TABLET ORAL at 02:30

## 2025-02-15 RX ADMIN — Medication 1000 MILLIGRAM(S): at 01:30

## 2025-02-15 RX ADMIN — EZETIMIBE 10 MILLIGRAM(S): 10 TABLET ORAL at 12:16

## 2025-02-15 RX ADMIN — Medication 1000 MILLIGRAM(S): at 16:33

## 2025-02-15 RX ADMIN — OXYCODONE HYDROCHLORIDE 5 MILLIGRAM(S): 30 TABLET ORAL at 05:34

## 2025-02-15 RX ADMIN — OXYCODONE HYDROCHLORIDE 5 MILLIGRAM(S): 30 TABLET ORAL at 19:34

## 2025-02-15 RX ADMIN — OXYCODONE HYDROCHLORIDE 5 MILLIGRAM(S): 30 TABLET ORAL at 09:49

## 2025-02-15 RX ADMIN — ROSUVASTATIN CALCIUM 40 MILLIGRAM(S): 20 TABLET, FILM COATED ORAL at 22:35

## 2025-02-15 RX ADMIN — Medication 40 MILLIGRAM(S): at 05:34

## 2025-02-15 RX ADMIN — OXYCODONE HYDROCHLORIDE 5 MILLIGRAM(S): 30 TABLET ORAL at 22:35

## 2025-02-15 RX ADMIN — OXYCODONE HYDROCHLORIDE 5 MILLIGRAM(S): 30 TABLET ORAL at 06:34

## 2025-02-15 RX ADMIN — Medication 1000 MILLIGRAM(S): at 23:05

## 2025-02-15 RX ADMIN — PREGABALIN 50 MILLIGRAM(S): 50 CAPSULE ORAL at 13:11

## 2025-02-15 RX ADMIN — OXYCODONE HYDROCHLORIDE 5 MILLIGRAM(S): 30 TABLET ORAL at 14:05

## 2025-02-15 RX ADMIN — ENOXAPARIN SODIUM 40 MILLIGRAM(S): 100 INJECTION SUBCUTANEOUS at 22:35

## 2025-02-15 RX ADMIN — METHOCARBAMOL 500 MILLIGRAM(S): 500 TABLET, FILM COATED ORAL at 22:35

## 2025-02-15 RX ADMIN — OXYCODONE HYDROCHLORIDE 5 MILLIGRAM(S): 30 TABLET ORAL at 01:30

## 2025-02-15 RX ADMIN — OXYCODONE HYDROCHLORIDE 5 MILLIGRAM(S): 30 TABLET ORAL at 18:34

## 2025-02-15 RX ADMIN — OXYCODONE HYDROCHLORIDE 5 MILLIGRAM(S): 30 TABLET ORAL at 10:49

## 2025-02-15 RX ADMIN — PREGABALIN 50 MILLIGRAM(S): 50 CAPSULE ORAL at 05:34

## 2025-02-15 RX ADMIN — Medication 1000 MILLIGRAM(S): at 07:44

## 2025-02-15 RX ADMIN — METHOCARBAMOL 500 MILLIGRAM(S): 500 TABLET, FILM COATED ORAL at 13:11

## 2025-02-15 RX ADMIN — PREGABALIN 50 MILLIGRAM(S): 50 CAPSULE ORAL at 22:35

## 2025-02-15 RX ADMIN — METHOCARBAMOL 500 MILLIGRAM(S): 500 TABLET, FILM COATED ORAL at 05:34

## 2025-02-15 NOTE — PROGRESS NOTE ADULT - ASSESSMENT
81 yo F with a PMH of chronic lower back pain, lumbar spinal stenosis, HLD, and HTN who presented for an elective L3-L4 laminectomy, PSF, TLIF which was completed on 2/12 without any complications. Medicine is consulted for comanagement.     #Lumbar spinal stenosis   #s/p L3-L4 laminectomy, PSF, TLIF  -pain management per ortho recs, tylenol, pregabalin, PRN oxycodone, PRN IV dilaudid for breakthrough pain   -bowel regimen with scheduled senna and miralax   -encourage working with PT, incentive spirometer   -DVT ppx per ortho team recs, on lovenox 40 mg daily     #HLD   -continue home rosuvastatin 40 mg daily     #HTN   -continue home losartan 50 mg daily     #Reactive leukocytosis   -likely related to recent surgery, no current concern for active infection   -continue to trend WBC to ensure improves   -improved    #Acute blood loss anemia  -likely mixture of phlebotomy and recent surgery   -continue to trend to ensure stable, transfuse for hemoglobin of 7 or lower   -hgb stable    #Hypokalemia   -replete with oral K today, would give at least 40 mEq  -improved

## 2025-02-15 NOTE — PROGRESS NOTE ADULT - SUBJECTIVE AND OBJECTIVE BOX
Medicine Progress Note    Patient is a 80y old  Female who presents with a chief complaint of Low back pain (15 Feb 2025 06:50)      SUBJECTIVE / OVERNIGHT EVENTS:  doiing well overnight. good discussion about getting up and starting to move around. has not tried a chair yet  Denies fever, chills, chest pain, shortness of breath, abd pain, N/V.    MEDICATIONS  (STANDING):  acetaminophen     Tablet .. 1000 milliGRAM(s) Oral every 8 hours  BUpivacaine liposome 1.3% Injectable (no eMAR) 20 milliLiter(s) Local Injection once  enoxaparin Injectable 40 milliGRAM(s) SubCutaneous every 24 hours  ezetimibe 10 milliGRAM(s) Oral daily  lactated ringers. 1000 milliLiter(s) (100 mL/Hr) IV Continuous <Continuous>  losartan 50 milliGRAM(s) Oral daily  methocarbamol 500 milliGRAM(s) Oral every 8 hours  ondansetron   Disintegrating Tablet 4 milliGRAM(s) Oral every 6 hours  pantoprazole    Tablet 40 milliGRAM(s) Oral before breakfast  polyethylene glycol 3350 17 Gram(s) Oral daily  pregabalin 50 milliGRAM(s) Oral three times a day  rosuvastatin 40 milliGRAM(s) Oral at bedtime  senna 2 Tablet(s) Oral at bedtime  sucralfate 2 Gram(s) Oral once    MEDICATIONS  (PRN):  HYDROmorphone  Injectable 0.5 milliGRAM(s) IV Push every 4 hours PRN breakthrough pain  HYDROmorphone  Injectable 0.5 milliGRAM(s) IV Push every 15 minutes PRN breakthrough pain in the PACU  metoclopramide Injectable 10 milliGRAM(s) IV Push every 8 hours PRN Refractory Nausea and/or Vomiting  oxyCODONE    IR 5 milliGRAM(s) Oral every 4 hours PRN Severe Pain (7 - 10)    CAPILLARY BLOOD GLUCOSE        I&O's Summary    14 Feb 2025 07:01  -  15 Feb 2025 07:00  --------------------------------------------------------  IN: 540 mL / OUT: 780 mL / NET: -240 mL        PHYSICAL EXAM:  Vital Signs Last 24 Hrs  T(C): 36.7 (15 Feb 2025 08:40), Max: 37.4 (14 Feb 2025 16:42)  T(F): 98 (15 Feb 2025 08:40), Max: 99.3 (14 Feb 2025 16:42)  HR: 77 (15 Feb 2025 08:40) (74 - 95)  BP: 111/61 (15 Feb 2025 08:40) (101/62 - 129/94)  BP(mean): --  RR: 17 (15 Feb 2025 08:40) (15 - 17)  SpO2: 95% (15 Feb 2025 08:40) (94% - 97%)    Parameters below as of 15 Feb 2025 08:40  Patient On (Oxygen Delivery Method): room air      Appears comfortable   MMM  Normal WOB on RA, CTAB  RRR, soft systolic murmur   Abdomen soft, nontender, nondistended. Drain in place with serosanguinous fluid.   Extremities warm and without edema   AOX3, no focal neuro deficits     LABS:                        9.5    10.03 )-----------( 452      ( 15 Feb 2025 07:42 )             30.2     02-15    137  |  103  |  9   ----------------------------<  102[H]  3.9   |  27  |  0.56    Ca    8.6      15 Feb 2025 07:42      Urinalysis Basic - ( 15 Feb 2025 07:42 )    Color: x / Appearance: x / SG: x / pH: x  Gluc: 102 mg/dL / Ketone: x  / Bili: x / Urobili: x   Blood: x / Protein: x / Nitrite: x   Leuk Esterase: x / RBC: x / WBC x   Sq Epi: x / Non Sq Epi: x / Bacteria: x            RADIOLOGY & ADDITIONAL TESTS:  Imaging from Last 24 Hours:    Electrocardiogram/QTc Interval:    COORDINATION OF CARE:  Care Discussed with Consultants/Other Providers:

## 2025-02-15 NOTE — PROGRESS NOTE ADULT - SUBJECTIVE AND OBJECTIVE BOX
Ortho Note    Pt comfortable without complaints, pain controlled  Denies CP, SOB, N/V, numbness/tingling     Vital Signs Last 24 Hrs  T(C): 36.6 (02-15-25 @ 05:35), Max: 36.6 (02-15-25 @ 05:35)  T(F): 97.8 (02-15-25 @ 05:35), Max: 97.8 (02-15-25 @ 05:35)  HR: 74 (02-15-25 @ 05:35) (74 - 74)  BP: 103/62 (02-15-25 @ 05:35) (103/62 - 103/62)  BP(mean): --  RR: 16 (02-15-25 @ 05:35) (16 - 16)  SpO2: 97% (02-15-25 @ 05:35) (97% - 97%)  I&O's Summary    13 Feb 2025 07:01  -  14 Feb 2025 07:00  --------------------------------------------------------  IN: 0 mL / OUT: 375 mL / NET: -375 mL    14 Feb 2025 07:01  -  15 Feb 2025 06:50  --------------------------------------------------------  IN: 540 mL / OUT: 780 mL / NET: -240 mL        General: Pt Alert and oriented, NAD  DSG C/D/I  Pulses: 2+  Sensation: SILT  Motor: 5/5 Quad/Ham/EHL/FHL/TA/GS                          10.0   12.73 )-----------( 507      ( 14 Feb 2025 05:30 )             30.7     02-14    140  |  104  |  8   ----------------------------<  99  3.3[L]   |  25  |  0.64    Ca    8.9      14 Feb 2025 05:30        A/P: 80yFemale POD#3 s/p L4-5 lami/PSF/TLIF, L3-4 lami  - Stable  - Pain Control  - DVT ppx: SCDs, lovenox  - PT, WBS: WBAT  - HV 30/80  - Cleared PT  - HPT pending drain removal this PM    Ortho Pager [phone]

## 2025-02-16 LAB
ANION GAP SERPL CALC-SCNC: 11 MMOL/L — SIGNIFICANT CHANGE UP (ref 5–17)
BUN SERPL-MCNC: 8 MG/DL — SIGNIFICANT CHANGE UP (ref 7–23)
CALCIUM SERPL-MCNC: 8.7 MG/DL — SIGNIFICANT CHANGE UP (ref 8.4–10.5)
CHLORIDE SERPL-SCNC: 102 MMOL/L — SIGNIFICANT CHANGE UP (ref 96–108)
CO2 SERPL-SCNC: 25 MMOL/L — SIGNIFICANT CHANGE UP (ref 22–31)
CREAT SERPL-MCNC: 0.54 MG/DL — SIGNIFICANT CHANGE UP (ref 0.5–1.3)
EGFR: 92 ML/MIN/1.73M2 — SIGNIFICANT CHANGE UP
GLUCOSE SERPL-MCNC: 110 MG/DL — HIGH (ref 70–99)
HCT VFR BLD CALC: 30.4 % — LOW (ref 34.5–45)
HGB BLD-MCNC: 9.8 G/DL — LOW (ref 11.5–15.5)
MCHC RBC-ENTMCNC: 30 PG — SIGNIFICANT CHANGE UP (ref 27–34)
MCHC RBC-ENTMCNC: 32.2 G/DL — SIGNIFICANT CHANGE UP (ref 32–36)
MCV RBC AUTO: 93 FL — SIGNIFICANT CHANGE UP (ref 80–100)
NRBC BLD AUTO-RTO: 0 /100 WBCS — SIGNIFICANT CHANGE UP (ref 0–0)
PLATELET # BLD AUTO: 487 K/UL — HIGH (ref 150–400)
POTASSIUM SERPL-MCNC: 3.7 MMOL/L — SIGNIFICANT CHANGE UP (ref 3.5–5.3)
POTASSIUM SERPL-SCNC: 3.7 MMOL/L — SIGNIFICANT CHANGE UP (ref 3.5–5.3)
RBC # BLD: 3.27 M/UL — LOW (ref 3.8–5.2)
RBC # FLD: 13 % — SIGNIFICANT CHANGE UP (ref 10.3–14.5)
SODIUM SERPL-SCNC: 138 MMOL/L — SIGNIFICANT CHANGE UP (ref 135–145)
WBC # BLD: 11.78 K/UL — HIGH (ref 3.8–10.5)
WBC # FLD AUTO: 11.78 K/UL — HIGH (ref 3.8–10.5)

## 2025-02-16 PROCEDURE — 99232 SBSQ HOSP IP/OBS MODERATE 35: CPT

## 2025-02-16 RX ORDER — OXYCODONE HYDROCHLORIDE 30 MG/1
5 TABLET ORAL ONCE
Refills: 0 | Status: DISCONTINUED | OUTPATIENT
Start: 2025-02-16 | End: 2025-02-16

## 2025-02-16 RX ORDER — OXYCODONE HYDROCHLORIDE 30 MG/1
5 TABLET ORAL EVERY 4 HOURS
Refills: 0 | Status: DISCONTINUED | OUTPATIENT
Start: 2025-02-16 | End: 2025-02-20

## 2025-02-16 RX ORDER — OXYCODONE HYDROCHLORIDE 30 MG/1
10 TABLET ORAL EVERY 4 HOURS
Refills: 0 | Status: DISCONTINUED | OUTPATIENT
Start: 2025-02-16 | End: 2025-02-18

## 2025-02-16 RX ADMIN — ROSUVASTATIN CALCIUM 40 MILLIGRAM(S): 20 TABLET, FILM COATED ORAL at 22:48

## 2025-02-16 RX ADMIN — ENOXAPARIN SODIUM 40 MILLIGRAM(S): 100 INJECTION SUBCUTANEOUS at 21:07

## 2025-02-16 RX ADMIN — Medication 1000 MILLIGRAM(S): at 17:01

## 2025-02-16 RX ADMIN — LOSARTAN POTASSIUM 50 MILLIGRAM(S): 100 TABLET, FILM COATED ORAL at 06:52

## 2025-02-16 RX ADMIN — Medication 1000 MILLIGRAM(S): at 07:09

## 2025-02-16 RX ADMIN — EZETIMIBE 10 MILLIGRAM(S): 10 TABLET ORAL at 11:09

## 2025-02-16 RX ADMIN — PREGABALIN 50 MILLIGRAM(S): 50 CAPSULE ORAL at 21:06

## 2025-02-16 RX ADMIN — Medication 1000 MILLIGRAM(S): at 18:17

## 2025-02-16 RX ADMIN — POLYETHYLENE GLYCOL 3350 17 GRAM(S): 17 POWDER, FOR SOLUTION ORAL at 11:09

## 2025-02-16 RX ADMIN — OXYCODONE HYDROCHLORIDE 5 MILLIGRAM(S): 30 TABLET ORAL at 12:09

## 2025-02-16 RX ADMIN — Medication 1000 MILLIGRAM(S): at 23:26

## 2025-02-16 RX ADMIN — Medication 4 MILLIGRAM(S): at 11:09

## 2025-02-16 RX ADMIN — OXYCODONE HYDROCHLORIDE 5 MILLIGRAM(S): 30 TABLET ORAL at 02:39

## 2025-02-16 RX ADMIN — OXYCODONE HYDROCHLORIDE 5 MILLIGRAM(S): 30 TABLET ORAL at 22:05

## 2025-02-16 RX ADMIN — PREGABALIN 50 MILLIGRAM(S): 50 CAPSULE ORAL at 13:54

## 2025-02-16 RX ADMIN — OXYCODONE HYDROCHLORIDE 5 MILLIGRAM(S): 30 TABLET ORAL at 07:52

## 2025-02-16 RX ADMIN — Medication 40 MILLIGRAM(S): at 06:52

## 2025-02-16 RX ADMIN — Medication 2 TABLET(S): at 21:06

## 2025-02-16 RX ADMIN — OXYCODONE HYDROCHLORIDE 5 MILLIGRAM(S): 30 TABLET ORAL at 11:08

## 2025-02-16 RX ADMIN — Medication 4 MILLIGRAM(S): at 17:01

## 2025-02-16 RX ADMIN — METHOCARBAMOL 500 MILLIGRAM(S): 500 TABLET, FILM COATED ORAL at 13:54

## 2025-02-16 RX ADMIN — OXYCODONE HYDROCHLORIDE 5 MILLIGRAM(S): 30 TABLET ORAL at 17:01

## 2025-02-16 RX ADMIN — OXYCODONE HYDROCHLORIDE 5 MILLIGRAM(S): 30 TABLET ORAL at 18:33

## 2025-02-16 RX ADMIN — METHOCARBAMOL 500 MILLIGRAM(S): 500 TABLET, FILM COATED ORAL at 21:07

## 2025-02-16 RX ADMIN — PREGABALIN 50 MILLIGRAM(S): 50 CAPSULE ORAL at 06:52

## 2025-02-16 RX ADMIN — OXYCODONE HYDROCHLORIDE 5 MILLIGRAM(S): 30 TABLET ORAL at 21:05

## 2025-02-16 RX ADMIN — OXYCODONE HYDROCHLORIDE 5 MILLIGRAM(S): 30 TABLET ORAL at 18:17

## 2025-02-16 RX ADMIN — OXYCODONE HYDROCHLORIDE 5 MILLIGRAM(S): 30 TABLET ORAL at 18:38

## 2025-02-16 RX ADMIN — METHOCARBAMOL 500 MILLIGRAM(S): 500 TABLET, FILM COATED ORAL at 06:53

## 2025-02-16 RX ADMIN — OXYCODONE HYDROCHLORIDE 5 MILLIGRAM(S): 30 TABLET ORAL at 03:39

## 2025-02-16 RX ADMIN — OXYCODONE HYDROCHLORIDE 5 MILLIGRAM(S): 30 TABLET ORAL at 06:52

## 2025-02-16 RX ADMIN — Medication 20 MILLIEQUIVALENT(S): at 11:09

## 2025-02-16 NOTE — PROGRESS NOTE ADULT - SUBJECTIVE AND OBJECTIVE BOX
INTERVAL EVENTS: No o/n events. Denies CP, dyspnea, palpitations, presyncope, syncope, f/c/n/v.     REVIEW OF SYSTEMS:  Constitutional:     [X] negative [ ] fevers [ ] chills [ ] weight loss [ ] weight gain  HEENT:                  [X] negative [ ] dry eyes [ ] eye irritation [ ] postnasal drip [ ] nasal congestion  CV:                         [X] negative  [ ] chest pain [ ] orthopnea [ ] palpitations [ ] murmur  Resp:                     [X] negative [ ] cough [ ] shortness of breath [ ] wheezing [ ] sputum [ ] hemoptysis  GI:                          [X] negative [ ] nausea [ ] vomiting [ ] diarrhea [ ] constipation [ ] abd pain [ ] dysphagia   :                        [X] negative [ ] dysuria [ ] nocturia [ ] hematuria [ ] increased urinary frequency  MSK:                      [ ] negative [X] back pain [ ] myalgias [ ] arthralgias [ ] fracture  Skin:                       [X] negative [ ] rash [ ] itch  Neuro:                   [X] negative [ ] headache [ ] dizziness [ ] syncope [ ] weakness [ ] numbness  Psych:                    [X] negative [ ] anxiety [ ] depression  Endo:                     [X] negative [ ] diabetes [ ] thyroid problem  Heme/Lymph:      [X] negative [ ] anemia [ ] bleeding problem  Allergic/Immune: [X] negative [ ] itchy eyes [ ] nasal discharge [ ] hives [ ] angioedema    [X] All other systems negative or otherwise described above.  [ ] Unable to assess ROS due to ________.    PAST MEDICAL & SURGICAL HISTORY:  Hypercholesteremia    Breast cancer  left s/p RT 10/2021    History of cholecystectomy    History of tonsillectomy and adenoidectomy    History of ankle surgery  right    S/P anal fissurectomy    H/O arthroscopy of knee  leftbrest lumpectomy    History of lumpectomy of left breast  2021      MEDICATIONS  (STANDING):  acetaminophen     Tablet .. 1000 milliGRAM(s) Oral every 8 hours  BUpivacaine liposome 1.3% Injectable (no eMAR) 20 milliLiter(s) Local Injection once  enoxaparin Injectable 40 milliGRAM(s) SubCutaneous every 24 hours  ezetimibe 10 milliGRAM(s) Oral daily  lactated ringers. 1000 milliLiter(s) (100 mL/Hr) IV Continuous <Continuous>  losartan 50 milliGRAM(s) Oral daily  methocarbamol 500 milliGRAM(s) Oral every 8 hours  ondansetron   Disintegrating Tablet 4 milliGRAM(s) Oral every 6 hours  pantoprazole    Tablet 40 milliGRAM(s) Oral before breakfast  polyethylene glycol 3350 17 Gram(s) Oral daily  pregabalin 50 milliGRAM(s) Oral three times a day  rosuvastatin 40 milliGRAM(s) Oral at bedtime  senna 2 Tablet(s) Oral at bedtime  sucralfate 2 Gram(s) Oral once    MEDICATIONS  (PRN):  HYDROmorphone  Injectable 0.5 milliGRAM(s) IV Push every 4 hours PRN breakthrough pain  HYDROmorphone  Injectable 0.5 milliGRAM(s) IV Push every 15 minutes PRN breakthrough pain in the PACU  metoclopramide Injectable 10 milliGRAM(s) IV Push every 8 hours PRN Refractory Nausea and/or Vomiting  oxyCODONE    IR 5 milliGRAM(s) Oral every 4 hours PRN Severe Pain (7 - 10)    ICU Vital Signs Last 24 Hrs  T(C): 37.1 (16 Feb 2025 09:05), Max: 37.3 (16 Feb 2025 05:45)  T(F): 98.8 (16 Feb 2025 09:05), Max: 99.2 (16 Feb 2025 05:45)  HR: 86 (16 Feb 2025 09:05) (64 - 87)  BP: 149/78 (16 Feb 2025 09:05) (118/67 - 149/78)  BP(mean): --  ABP: --  ABP(mean): --  RR: 18 (16 Feb 2025 09:05) (15 - 18)  SpO2: 94% (16 Feb 2025 09:05) (94% - 95%)    O2 Parameters below as of 16 Feb 2025 09:05  Patient On (Oxygen Delivery Method): room air          Orthostatic VS    Daily     Daily   I&O's Summary      PHYSICAL EXAM:  Appears comfortable   MMM  Normal WOB on RA, CTAB  RRR, soft systolic murmur   Abdomen soft, nontender, nondistended. Drain in place with serosanguinous fluid.   Extremities warm and without edema   AOX3, no focal neuro deficits     LABS:                        9.8    11.78 )-----------( 487      ( 16 Feb 2025 07:26 )             30.4       02-16    138  |  102  |  8   ----------------------------<  110[H]  3.7   |  25  |  0.54    Ca    8.7      16 Feb 2025 07:26            Urinalysis Basic - ( 16 Feb 2025 07:26 )    Color: x / Appearance: x / SG: x / pH: x  Gluc: 110 mg/dL / Ketone: x  / Bili: x / Urobili: x   Blood: x / Protein: x / Nitrite: x   Leuk Esterase: x / RBC: x / WBC x   Sq Epi: x / Non Sq Epi: x / Bacteria: x          RADIOLOGY & ADDITIONAL STUDIES: Reviewed

## 2025-02-16 NOTE — PROGRESS NOTE ADULT - SUBJECTIVE AND OBJECTIVE BOX
Ortho Note    Pt comfortable without complaints, pain controlled  Denies CP, SOB, N/V, numbness/tingling     Vital Signs Last 24 Hrs  T(C): 37.3 (02-16-25 @ 05:45), Max: 37.3 (02-16-25 @ 05:45)  T(F): 99.2 (02-16-25 @ 05:45), Max: 99.2 (02-16-25 @ 05:45)  HR: 87 (02-16-25 @ 05:45) (87 - 87)  BP: 135/81 (02-16-25 @ 05:45) (135/81 - 135/81)  BP(mean): --  RR: 16 (02-16-25 @ 05:45) (16 - 16)  SpO2: 95% (02-16-25 @ 05:45) (95% - 95%)  I&O's Summary      General: Pt Alert and oriented, NAD  DSG C/D/I  Pulses: 2+  Sensation: SILT  Motor: 5/5 Quad/Ham/EHL/FHL/TA/GS                          9.8    11.78 )-----------( 487      ( 16 Feb 2025 07:26 )             30.4     02-16    138  |  102  |  8   ----------------------------<  110[H]  3.7   |  25  |  0.54    Ca    8.7      16 Feb 2025 07:26        A/P: 80yFemale POD#4 s/p L4-5 TLIF/PSF/Lami  - Stable  - Pain Control  - DVT ppx: SCds, lovenox  - PT, WBS: WBAT  - HV dc'ed  - Cleared PT  - HPT today vs tmrw    Ortho Pager [phone]

## 2025-02-16 NOTE — PROGRESS NOTE ADULT - ASSESSMENT
81 yo F with a PMH of chronic lower back pain, lumbar spinal stenosis, HLD, and HTN who presented for an elective L3-L4 laminectomy, PSF, TLIF which was completed on 2/12 without any complications. Medicine is consulted for comanagement.     #Lumbar spinal stenosis   #s/p L3-L4 laminectomy, PSF, TLIF  -pain management per ortho recs, tylenol, pregabalin, PRN oxycodone, PRN IV dilaudid for breakthrough pain   -bowel regimen with scheduled senna and miralax   -encourage working with PT, incentive spirometer   -DVT ppx per ortho team recs, on lovenox 40 mg daily     #HLD   -continue home rosuvastatin 40 mg daily     #HTN   -continue home losartan 50 mg daily     #Reactive leukocytosis   -likely related to recent surgery, no current concern for active infection   -continue to trend WBC to ensure improves   -improved    #Acute blood loss anemia  -likely mixture of phlebotomy and recent surgery   -continue to trend to ensure stable, transfuse for hemoglobin of 7 or lower   -hgb stable    #Hypokalemia   -replete with oral K today  -improved

## 2025-02-17 LAB
ANION GAP SERPL CALC-SCNC: 10 MMOL/L — SIGNIFICANT CHANGE UP (ref 5–17)
APPEARANCE UR: CLEAR — SIGNIFICANT CHANGE UP
BILIRUB UR-MCNC: NEGATIVE — SIGNIFICANT CHANGE UP
BUN SERPL-MCNC: 7 MG/DL — SIGNIFICANT CHANGE UP (ref 7–23)
CALCIUM SERPL-MCNC: 8.9 MG/DL — SIGNIFICANT CHANGE UP (ref 8.4–10.5)
CHLORIDE SERPL-SCNC: 101 MMOL/L — SIGNIFICANT CHANGE UP (ref 96–108)
CO2 SERPL-SCNC: 25 MMOL/L — SIGNIFICANT CHANGE UP (ref 22–31)
COLOR SPEC: YELLOW — SIGNIFICANT CHANGE UP
CREAT SERPL-MCNC: 0.61 MG/DL — SIGNIFICANT CHANGE UP (ref 0.5–1.3)
DIFF PNL FLD: ABNORMAL
EGFR: 90 ML/MIN/1.73M2 — SIGNIFICANT CHANGE UP
GLUCOSE SERPL-MCNC: 115 MG/DL — HIGH (ref 70–99)
GLUCOSE UR QL: NEGATIVE MG/DL — SIGNIFICANT CHANGE UP
HCT VFR BLD CALC: 30.3 % — LOW (ref 34.5–45)
HGB BLD-MCNC: 9.9 G/DL — LOW (ref 11.5–15.5)
KETONES UR-MCNC: ABNORMAL MG/DL
LEUKOCYTE ESTERASE UR-ACNC: NEGATIVE — SIGNIFICANT CHANGE UP
MCHC RBC-ENTMCNC: 30.2 PG — SIGNIFICANT CHANGE UP (ref 27–34)
MCHC RBC-ENTMCNC: 32.7 G/DL — SIGNIFICANT CHANGE UP (ref 32–36)
MCV RBC AUTO: 92.4 FL — SIGNIFICANT CHANGE UP (ref 80–100)
NITRITE UR-MCNC: NEGATIVE — SIGNIFICANT CHANGE UP
NRBC BLD AUTO-RTO: 0 /100 WBCS — SIGNIFICANT CHANGE UP (ref 0–0)
PH UR: 6 — SIGNIFICANT CHANGE UP (ref 5–8)
PLATELET # BLD AUTO: 469 K/UL — HIGH (ref 150–400)
POTASSIUM SERPL-MCNC: 4 MMOL/L — SIGNIFICANT CHANGE UP (ref 3.5–5.3)
POTASSIUM SERPL-SCNC: 4 MMOL/L — SIGNIFICANT CHANGE UP (ref 3.5–5.3)
PROT UR-MCNC: NEGATIVE MG/DL — SIGNIFICANT CHANGE UP
RAPID RVP RESULT: SIGNIFICANT CHANGE UP
RBC # BLD: 3.28 M/UL — LOW (ref 3.8–5.2)
RBC # FLD: 13.2 % — SIGNIFICANT CHANGE UP (ref 10.3–14.5)
SARS-COV-2 RNA SPEC QL NAA+PROBE: SIGNIFICANT CHANGE UP
SODIUM SERPL-SCNC: 136 MMOL/L — SIGNIFICANT CHANGE UP (ref 135–145)
SP GR SPEC: 1.02 — SIGNIFICANT CHANGE UP (ref 1–1.03)
UROBILINOGEN FLD QL: 1 MG/DL — SIGNIFICANT CHANGE UP (ref 0.2–1)
WBC # BLD: 16.85 K/UL — HIGH (ref 3.8–10.5)
WBC # FLD AUTO: 16.85 K/UL — HIGH (ref 3.8–10.5)

## 2025-02-17 PROCEDURE — 99233 SBSQ HOSP IP/OBS HIGH 50: CPT

## 2025-02-17 RX ADMIN — OXYCODONE HYDROCHLORIDE 5 MILLIGRAM(S): 30 TABLET ORAL at 19:25

## 2025-02-17 RX ADMIN — OXYCODONE HYDROCHLORIDE 10 MILLIGRAM(S): 30 TABLET ORAL at 07:35

## 2025-02-17 RX ADMIN — LOSARTAN POTASSIUM 50 MILLIGRAM(S): 100 TABLET, FILM COATED ORAL at 05:26

## 2025-02-17 RX ADMIN — Medication 1000 MILLIGRAM(S): at 22:00

## 2025-02-17 RX ADMIN — ENOXAPARIN SODIUM 40 MILLIGRAM(S): 100 INJECTION SUBCUTANEOUS at 21:00

## 2025-02-17 RX ADMIN — Medication 40 MILLIGRAM(S): at 05:27

## 2025-02-17 RX ADMIN — Medication 1000 MILLIGRAM(S): at 14:30

## 2025-02-17 RX ADMIN — OXYCODONE HYDROCHLORIDE 10 MILLIGRAM(S): 30 TABLET ORAL at 06:35

## 2025-02-17 RX ADMIN — OXYCODONE HYDROCHLORIDE 10 MILLIGRAM(S): 30 TABLET ORAL at 10:38

## 2025-02-17 RX ADMIN — Medication 2 TABLET(S): at 21:01

## 2025-02-17 RX ADMIN — OXYCODONE HYDROCHLORIDE 5 MILLIGRAM(S): 30 TABLET ORAL at 18:18

## 2025-02-17 RX ADMIN — OXYCODONE HYDROCHLORIDE 10 MILLIGRAM(S): 30 TABLET ORAL at 11:40

## 2025-02-17 RX ADMIN — OXYCODONE HYDROCHLORIDE 10 MILLIGRAM(S): 30 TABLET ORAL at 23:18

## 2025-02-17 RX ADMIN — METHOCARBAMOL 500 MILLIGRAM(S): 500 TABLET, FILM COATED ORAL at 14:16

## 2025-02-17 RX ADMIN — Medication 4 MILLIGRAM(S): at 18:18

## 2025-02-17 RX ADMIN — Medication 1000 MILLIGRAM(S): at 14:16

## 2025-02-17 RX ADMIN — OXYCODONE HYDROCHLORIDE 10 MILLIGRAM(S): 30 TABLET ORAL at 22:18

## 2025-02-17 RX ADMIN — Medication 1000 MILLIGRAM(S): at 07:57

## 2025-02-17 RX ADMIN — METHOCARBAMOL 500 MILLIGRAM(S): 500 TABLET, FILM COATED ORAL at 21:00

## 2025-02-17 RX ADMIN — METHOCARBAMOL 500 MILLIGRAM(S): 500 TABLET, FILM COATED ORAL at 05:27

## 2025-02-17 RX ADMIN — Medication 4 MILLIGRAM(S): at 11:50

## 2025-02-17 RX ADMIN — ROSUVASTATIN CALCIUM 40 MILLIGRAM(S): 20 TABLET, FILM COATED ORAL at 21:01

## 2025-02-17 RX ADMIN — PREGABALIN 50 MILLIGRAM(S): 50 CAPSULE ORAL at 14:16

## 2025-02-17 RX ADMIN — OXYCODONE HYDROCHLORIDE 5 MILLIGRAM(S): 30 TABLET ORAL at 00:03

## 2025-02-17 RX ADMIN — PREGABALIN 50 MILLIGRAM(S): 50 CAPSULE ORAL at 05:27

## 2025-02-17 RX ADMIN — Medication 1000 MILLIGRAM(S): at 21:00

## 2025-02-17 RX ADMIN — EZETIMIBE 10 MILLIGRAM(S): 10 TABLET ORAL at 11:50

## 2025-02-17 RX ADMIN — OXYCODONE HYDROCHLORIDE 5 MILLIGRAM(S): 30 TABLET ORAL at 01:03

## 2025-02-17 RX ADMIN — Medication 1000 MILLIGRAM(S): at 09:04

## 2025-02-17 RX ADMIN — PREGABALIN 50 MILLIGRAM(S): 50 CAPSULE ORAL at 21:00

## 2025-02-17 NOTE — PROGRESS NOTE ADULT - ASSESSMENT
79 yo F with a PMH of chronic lower back pain, lumbar spinal stenosis, HLD, and HTN who presented for an elective L3-L4 laminectomy, PSF, TLIF which was completed on 2/12 without any complications. Medicine is consulted for comanagement.     #Lumbar spinal stenosis   #s/p L3-L4 laminectomy, PSF, TLIF  -pain management per ortho recs, tylenol, pregabalin, PRN oxycodone, PRN IV dilaudid for breakthrough pain   -bowel regimen with scheduled senna and miralax   -encourage working with PT, incentive spirometer   -DVT ppx per ortho team recs, on lovenox 40 mg daily     #HLD   -continue home rosuvastatin 40 mg daily     #HTN   -continue home losartan 50 mg daily     #Leukocytosis/fever   -unclear etiology  -will perform infectious workup    #Acute blood loss anemia  -likely mixture of phlebotomy and recent surgery   -continue to trend to ensure stable, transfuse for hemoglobin of 7 or lower   -hgb stable    #Hypokalemia   -replete with oral K today  -improved   81 yo F with a PMH of chronic lower back pain, lumbar spinal stenosis, HLD, and HTN who presented for an elective L3-L4 laminectomy, PSF, TLIF which was completed on 2/12 without any complications. Medicine is consulted for comanagement.     #Lumbar spinal stenosis   #s/p L3-L4 laminectomy, PSF, TLIF  -pain management per ortho recs, tylenol, pregabalin, PRN oxycodone, PRN IV dilaudid for breakthrough pain   -bowel regimen with scheduled senna and miralax   -encourage working with PT, incentive spirometer   -DVT ppx per ortho team recs, on lovenox 40 mg daily     #HLD   -continue home rosuvastatin 40 mg daily     #HTN   -continue home losartan 50 mg daily     #Leukocytosis/fever   -unclear etiology; patient reports fevers and chills but has no pain or localizing symptoms; surgical site appears clean, dry, and noninfectious; no genitourinary, gastrointestinal, or respiratory complaints; extremities are WNL with no suggestion of thrombus; integumentary exam was negative for cellulitis or abscess  -will perform infectious workup; will obtain u/a, RVP, COVID, CXR, and blood cultures  -if fevers persist, will consider CT spine  -will defer abx for now    #Acute blood loss anemia  -likely mixture of phlebotomy and recent surgery   -continue to trend to ensure stable, transfuse for hemoglobin of 7 or lower   -hgb stable    #Hypokalemia   -replete with oral K today  -improved

## 2025-02-17 NOTE — PROGRESS NOTE ADULT - SUBJECTIVE AND OBJECTIVE BOX
INTERVAL EVENTS: No o/n events. Febrile in the AM. Denies CP, dyspnea, palpitations, presyncope, syncope, n/v.     REVIEW OF SYSTEMS:  Constitutional:     [ ] negative [X] fevers [ ] chills [ ] weight loss [ ] weight gain  HEENT:                  [X] negative [ ] dry eyes [ ] eye irritation [ ] postnasal drip [ ] nasal congestion  CV:                         [X] negative  [ ] chest pain [ ] orthopnea [ ] palpitations [ ] murmur  Resp:                     [X] negative [ ] cough [ ] shortness of breath [ ] wheezing [ ] sputum [ ] hemoptysis  GI:                          [X] negative [ ] nausea [ ] vomiting [ ] diarrhea [ ] constipation [ ] abd pain [ ] dysphagia   :                        [X] negative [ ] dysuria [ ] nocturia [ ] hematuria [ ] increased urinary frequency  MSK:                      [ ] negative [X] back pain [ ] myalgias [ ] arthralgias [ ] fracture  Skin:                       [X] negative [ ] rash [ ] itch  Neuro:                   [X] negative [ ] headache [ ] dizziness [ ] syncope [ ] weakness [ ] numbness  Psych:                    [X] negative [ ] anxiety [ ] depression  Endo:                     [X] negative [ ] diabetes [ ] thyroid problem  Heme/Lymph:      [X] negative [ ] anemia [ ] bleeding problem  Allergic/Immune: [X] negative [ ] itchy eyes [ ] nasal discharge [ ] hives [ ] angioedema    [X] All other systems negative or otherwise described above.  [ ] Unable to assess ROS due to ________.    PAST MEDICAL & SURGICAL HISTORY:  Hypercholesteremia    Breast cancer  left s/p RT 10/2021    History of cholecystectomy    History of tonsillectomy and adenoidectomy    History of ankle surgery  right    S/P anal fissurectomy    H/O arthroscopy of knee  leftbrest lumpectomy    History of lumpectomy of left breast  2021      MEDICATIONS  (STANDING):  acetaminophen     Tablet .. 1000 milliGRAM(s) Oral every 8 hours  BUpivacaine liposome 1.3% Injectable (no eMAR) 20 milliLiter(s) Local Injection once  enoxaparin Injectable 40 milliGRAM(s) SubCutaneous every 24 hours  ezetimibe 10 milliGRAM(s) Oral daily  lactated ringers. 1000 milliLiter(s) (100 mL/Hr) IV Continuous <Continuous>  losartan 50 milliGRAM(s) Oral daily  methocarbamol 500 milliGRAM(s) Oral every 8 hours  ondansetron   Disintegrating Tablet 4 milliGRAM(s) Oral every 6 hours  pantoprazole    Tablet 40 milliGRAM(s) Oral before breakfast  polyethylene glycol 3350 17 Gram(s) Oral daily  pregabalin 50 milliGRAM(s) Oral three times a day  rosuvastatin 40 milliGRAM(s) Oral at bedtime  senna 2 Tablet(s) Oral at bedtime  sucralfate 2 Gram(s) Oral once    MEDICATIONS  (PRN):  HYDROmorphone  Injectable 0.5 milliGRAM(s) IV Push every 4 hours PRN breakthrough pain  HYDROmorphone  Injectable 0.5 milliGRAM(s) IV Push every 15 minutes PRN breakthrough pain in the PACU  metoclopramide Injectable 10 milliGRAM(s) IV Push every 8 hours PRN Refractory Nausea and/or Vomiting  oxyCODONE    IR 5 milliGRAM(s) Oral every 4 hours PRN Moderate Pain (4 - 6)  oxyCODONE    IR 10 milliGRAM(s) Oral every 4 hours PRN Severe Pain (7 - 10)    ICU Vital Signs Last 24 Hrs  T(C): 37.3 (17 Feb 2025 08:47), Max: 38.3 (17 Feb 2025 05:15)  T(F): 99.1 (17 Feb 2025 08:47), Max: 101 (17 Feb 2025 07:57)  HR: 95 (17 Feb 2025 08:47) (82 - 95)  BP: 129/72 (17 Feb 2025 08:47) (122/70 - 149/78)  BP(mean): --  ABP: --  ABP(mean): --  RR: 18 (17 Feb 2025 08:47) (16 - 18)  SpO2: 95% (17 Feb 2025 08:47) (94% - 98%)    O2 Parameters below as of 17 Feb 2025 08:47  Patient On (Oxygen Delivery Method): room air          Orthostatic VS    Daily     Daily   I&O's Summary      PHYSICAL EXAM:  Appears comfortable   MMM  Normal WOB on RA, CTAB  RRR, soft systolic murmur   Abdomen soft, nontender, nondistended. Drain in place with serosanguinous fluid.   Extremities warm and without edema   AOX3, no focal neuro deficits     LABS:                        9.9    16.85 )-----------( 469      ( 17 Feb 2025 05:30 )             30.3       02-17    136  |  101  |  7   ----------------------------<  115[H]  4.0   |  25  |  0.61    Ca    8.9      17 Feb 2025 05:30            Urinalysis Basic - ( 17 Feb 2025 05:30 )    Color: x / Appearance: x / SG: x / pH: x  Gluc: 115 mg/dL / Ketone: x  / Bili: x / Urobili: x   Blood: x / Protein: x / Nitrite: x   Leuk Esterase: x / RBC: x / WBC x   Sq Epi: x / Non Sq Epi: x / Bacteria: x          RADIOLOGY & ADDITIONAL STUDIES: Reviewed

## 2025-02-17 NOTE — PROGRESS NOTE ADULT - SUBJECTIVE AND OBJECTIVE BOX
Ortho Note    Pt comfortable without complaints, pain controlled  Denies CP, SOB, N/V, numbness/tingling     Vital Signs Last 24 Hrs  T(C): --  T(F): --  HR: --  BP: --  BP(mean): --  RR: --  SpO2: --  I&O's Summary      General: Pt Alert and oriented, NAD  DSG C/D/I  Pulses: 2+  Sensation: SILT  Motor: 5/5 Quad/Ham/EHL/FHL/TA/GS                          9.8    11.78 )-----------( 487      ( 16 Feb 2025 07:26 )             30.4     02-16    138  |  102  |  8   ----------------------------<  110[H]  3.7   |  25  |  0.54    Ca    8.7      16 Feb 2025 07:26        A/P: 80yFemale POD#5 s/p L4-5 lami/PSF/TLIF  - Stable  - Pain Control  - DVT ppx: SCds, lovenox  - PT, WBS: WBAT  - Cleared PT  - Home today    Ortho Pager [phone]

## 2025-02-18 LAB
ANION GAP SERPL CALC-SCNC: 10 MMOL/L — SIGNIFICANT CHANGE UP (ref 5–17)
BUN SERPL-MCNC: 11 MG/DL — SIGNIFICANT CHANGE UP (ref 7–23)
CALCIUM SERPL-MCNC: 9.1 MG/DL — SIGNIFICANT CHANGE UP (ref 8.4–10.5)
CHLORIDE SERPL-SCNC: 102 MMOL/L — SIGNIFICANT CHANGE UP (ref 96–108)
CO2 SERPL-SCNC: 28 MMOL/L — SIGNIFICANT CHANGE UP (ref 22–31)
CREAT SERPL-MCNC: 0.69 MG/DL — SIGNIFICANT CHANGE UP (ref 0.5–1.3)
EGFR: 87 ML/MIN/1.73M2 — SIGNIFICANT CHANGE UP
GLUCOSE SERPL-MCNC: 128 MG/DL — HIGH (ref 70–99)
HCT VFR BLD CALC: 29.5 % — LOW (ref 34.5–45)
HGB BLD-MCNC: 9.1 G/DL — LOW (ref 11.5–15.5)
MCHC RBC-ENTMCNC: 28.3 PG — SIGNIFICANT CHANGE UP (ref 27–34)
MCHC RBC-ENTMCNC: 30.8 G/DL — LOW (ref 32–36)
MCV RBC AUTO: 91.9 FL — SIGNIFICANT CHANGE UP (ref 80–100)
NRBC BLD AUTO-RTO: 0 /100 WBCS — SIGNIFICANT CHANGE UP (ref 0–0)
PLATELET # BLD AUTO: 418 K/UL — HIGH (ref 150–400)
POTASSIUM SERPL-MCNC: 4.2 MMOL/L — SIGNIFICANT CHANGE UP (ref 3.5–5.3)
POTASSIUM SERPL-SCNC: 4.2 MMOL/L — SIGNIFICANT CHANGE UP (ref 3.5–5.3)
RBC # BLD: 3.21 M/UL — LOW (ref 3.8–5.2)
RBC # FLD: 13.3 % — SIGNIFICANT CHANGE UP (ref 10.3–14.5)
SODIUM SERPL-SCNC: 140 MMOL/L — SIGNIFICANT CHANGE UP (ref 135–145)
WBC # BLD: 14.8 K/UL — HIGH (ref 3.8–10.5)
WBC # FLD AUTO: 14.8 K/UL — HIGH (ref 3.8–10.5)

## 2025-02-18 PROCEDURE — 99233 SBSQ HOSP IP/OBS HIGH 50: CPT

## 2025-02-18 PROCEDURE — 71045 X-RAY EXAM CHEST 1 VIEW: CPT | Mod: 26

## 2025-02-18 RX ORDER — OXYCODONE HYDROCHLORIDE 30 MG/1
7.5 TABLET ORAL EVERY 4 HOURS
Refills: 0 | Status: DISCONTINUED | OUTPATIENT
Start: 2025-02-18 | End: 2025-02-20

## 2025-02-18 RX ORDER — PREGABALIN 50 MG/1
50 CAPSULE ORAL THREE TIMES A DAY
Refills: 0 | Status: DISCONTINUED | OUTPATIENT
Start: 2025-02-18 | End: 2025-02-20

## 2025-02-18 RX ADMIN — OXYCODONE HYDROCHLORIDE 7.5 MILLIGRAM(S): 30 TABLET ORAL at 11:11

## 2025-02-18 RX ADMIN — METHOCARBAMOL 500 MILLIGRAM(S): 500 TABLET, FILM COATED ORAL at 21:19

## 2025-02-18 RX ADMIN — OXYCODONE HYDROCHLORIDE 10 MILLIGRAM(S): 30 TABLET ORAL at 02:38

## 2025-02-18 RX ADMIN — METHOCARBAMOL 500 MILLIGRAM(S): 500 TABLET, FILM COATED ORAL at 14:04

## 2025-02-18 RX ADMIN — OXYCODONE HYDROCHLORIDE 7.5 MILLIGRAM(S): 30 TABLET ORAL at 23:42

## 2025-02-18 RX ADMIN — ROSUVASTATIN CALCIUM 40 MILLIGRAM(S): 20 TABLET, FILM COATED ORAL at 21:18

## 2025-02-18 RX ADMIN — ENOXAPARIN SODIUM 40 MILLIGRAM(S): 100 INJECTION SUBCUTANEOUS at 21:18

## 2025-02-18 RX ADMIN — METHOCARBAMOL 500 MILLIGRAM(S): 500 TABLET, FILM COATED ORAL at 05:14

## 2025-02-18 RX ADMIN — Medication 1000 MILLIGRAM(S): at 14:10

## 2025-02-18 RX ADMIN — Medication 40 MILLIGRAM(S): at 05:15

## 2025-02-18 RX ADMIN — Medication 4 MILLIGRAM(S): at 05:14

## 2025-02-18 RX ADMIN — Medication 400 MILLIGRAM(S): at 21:18

## 2025-02-18 RX ADMIN — PREGABALIN 50 MILLIGRAM(S): 50 CAPSULE ORAL at 14:04

## 2025-02-18 RX ADMIN — Medication 1000 MILLIGRAM(S): at 06:13

## 2025-02-18 RX ADMIN — OXYCODONE HYDROCHLORIDE 10 MILLIGRAM(S): 30 TABLET ORAL at 03:38

## 2025-02-18 RX ADMIN — PREGABALIN 50 MILLIGRAM(S): 50 CAPSULE ORAL at 21:18

## 2025-02-18 RX ADMIN — OXYCODONE HYDROCHLORIDE 7.5 MILLIGRAM(S): 30 TABLET ORAL at 16:41

## 2025-02-18 RX ADMIN — EZETIMIBE 10 MILLIGRAM(S): 10 TABLET ORAL at 12:24

## 2025-02-18 RX ADMIN — OXYCODONE HYDROCHLORIDE 10 MILLIGRAM(S): 30 TABLET ORAL at 07:47

## 2025-02-18 RX ADMIN — Medication 1000 MILLIGRAM(S): at 22:18

## 2025-02-18 RX ADMIN — Medication 1000 MILLIGRAM(S): at 05:13

## 2025-02-18 RX ADMIN — PREGABALIN 50 MILLIGRAM(S): 50 CAPSULE ORAL at 05:13

## 2025-02-18 RX ADMIN — OXYCODONE HYDROCHLORIDE 7.5 MILLIGRAM(S): 30 TABLET ORAL at 22:32

## 2025-02-18 RX ADMIN — OXYCODONE HYDROCHLORIDE 7.5 MILLIGRAM(S): 30 TABLET ORAL at 13:48

## 2025-02-18 RX ADMIN — Medication 1000 MILLIGRAM(S): at 14:04

## 2025-02-18 RX ADMIN — POLYETHYLENE GLYCOL 3350 17 GRAM(S): 17 POWDER, FOR SOLUTION ORAL at 12:25

## 2025-02-18 RX ADMIN — OXYCODONE HYDROCHLORIDE 10 MILLIGRAM(S): 30 TABLET ORAL at 06:47

## 2025-02-18 RX ADMIN — Medication 1000 MILLIGRAM(S): at 21:18

## 2025-02-18 RX ADMIN — OXYCODONE HYDROCHLORIDE 7.5 MILLIGRAM(S): 30 TABLET ORAL at 16:49

## 2025-02-18 NOTE — DIETITIAN INITIAL EVALUATION ADULT - PERTINENT MEDS FT
MEDICATIONS  (STANDING):  acetaminophen     Tablet .. 1000 milliGRAM(s) Oral every 8 hours  acyclovir   Oral Tab/Cap 400 milliGRAM(s) Oral three times a day  BUpivacaine liposome 1.3% Injectable (no eMAR) 20 milliLiter(s) Local Injection once  enoxaparin Injectable 40 milliGRAM(s) SubCutaneous every 24 hours  ezetimibe 10 milliGRAM(s) Oral daily  lactated ringers. 1000 milliLiter(s) (100 mL/Hr) IV Continuous <Continuous>  losartan 50 milliGRAM(s) Oral daily  methocarbamol 500 milliGRAM(s) Oral every 8 hours  ondansetron   Disintegrating Tablet 4 milliGRAM(s) Oral every 6 hours  pantoprazole    Tablet 40 milliGRAM(s) Oral before breakfast  polyethylene glycol 3350 17 Gram(s) Oral daily  pregabalin 50 milliGRAM(s) Oral three times a day  rosuvastatin 40 milliGRAM(s) Oral at bedtime  senna 2 Tablet(s) Oral at bedtime  sucralfate 2 Gram(s) Oral once    MEDICATIONS  (PRN):  HYDROmorphone  Injectable 0.5 milliGRAM(s) IV Push every 4 hours PRN breakthrough pain  HYDROmorphone  Injectable 0.5 milliGRAM(s) IV Push every 15 minutes PRN breakthrough pain in the PACU  metoclopramide Injectable 10 milliGRAM(s) IV Push every 8 hours PRN Refractory Nausea and/or Vomiting  oxyCODONE    IR 5 milliGRAM(s) Oral every 4 hours PRN Moderate Pain (4 - 6)  oxyCODONE    IR 7.5 milliGRAM(s) Oral every 4 hours PRN Severe Pain (7 - 10)

## 2025-02-18 NOTE — PROGRESS NOTE ADULT - ASSESSMENT
Ms. Layla Grier is an 80/F with chronic lower back pain, lumbar spinal stenosis, HLD, and HTN who presented for an elective L3-L4 laminectomy, PSF, TLIF.    Recommendations:    #Lumbar spinal stenosis   #Post op state  - Provide adequate analgesia, Incentive spirometry, mobilize with fall precautions, bowel regimen, DVT prophylaxis  - pain regimen: Tylenol, Methocarbamol, Pregabalin and PRN Oxycodone and Hydromorphone  - bowel regimen: Miralax and Senna   - PT/OT    #HLD   - continue Rosuvastatin 40 mg daily     #HTN   - continue Losartan 50 mg daily with BP parameters  - monitor for orthostatic hypotension    #Leukocytosis  #Transient fever, now improved  #Herpes simplex labialis  - only reports development of cold sore on lower lip and no other symptoms suggestive of infection  - WBC trending down, RVP negative, CXR clear lungs  - can start Acyclovir 400 mg PO q8h x 5 days for herpes simplex recurrence; patient educated about risks/side effects and benefits of medications  - if fever recurs then consider sending blood cultures and post op wound assessment    #Acute blood loss anemia  - no reported bleeding symptoms  - Monitor Hgb  - Transfuse for Hgb < 7  - Ensure Type and Screen available    DVT ppx with Lovenox 40 mg daily  Dispo: HPT    Feel free to reach out for any questions. Recommendations discussed with primary team.

## 2025-02-18 NOTE — PROGRESS NOTE ADULT - SUBJECTIVE AND OBJECTIVE BOX
Ortho Note    Pt comfortable without complaints, pain controlled  Denies CP, SOB, N/V, numbness/tingling     Vital Signs Last 24 Hrs  T(C): 37 (02-18-25 @ 04:55), Max: 37 (02-18-25 @ 04:55)  T(F): 98.6 (02-18-25 @ 04:55), Max: 98.6 (02-18-25 @ 04:55)  HR: 83 (02-18-25 @ 04:55) (83 - 83)  BP: 125/66 (02-18-25 @ 04:55) (125/66 - 125/66)  BP(mean): --  RR: 18 (02-18-25 @ 04:55) (18 - 18)  SpO2: 98% (02-18-25 @ 04:55) (98% - 98%)  I&O's Summary      General: Pt Alert and oriented, NAD  DSG C/D/I  Pulses: 2+  Sensation: SILT  Motor: 5/5 Quad/Ham/EHL/FHL/TA/GS                          9.1    14.80 )-----------( 418      ( 18 Feb 2025 06:24 )             29.5     02-18    140  |  102  |  11  ----------------------------<  128[H]  4.2   |  28  |  0.69    Ca    9.1      18 Feb 2025 06:24      A/P: 80yFemale POD#6 s/p L4-5 lami/PSF/TLIF  - Stable  - Pain Control  - DVT ppx: SCds, lovenox  - PT, WBS: WBAT  - Cleared PT  - Home today    Ortho Pager [phone]  Ortho Pager [phone]

## 2025-02-18 NOTE — DIETITIAN INITIAL EVALUATION ADULT - OTHER INFO
This is an 80 year old female with low back pain x 2 months. Pt reports waking up on December 8th with "stabbing" pain in her lower back radiating to her right buttock. She reports that the pain radiates down the anterior aspect of her right thigh and travels across her lower back. She reports + N/T of her right shin. Pt is status post L4-5 laminectomy, PSF, TLIF and L3-4 laminectomy 2/12/25.     Pt seen in room for nutrition assessment. Pt reports fair appetite PTA and improved appetite during hospital stay. As per diet recall PTA: pt stated she was eating small meals, less via PO related to her severe reported pain. Currently on regular diet, tolerating well, noted with ~75% PO intakes overall. No cultural, Catholic, or ethnic food preferences noted. No known food allergies. No supplements (micronutrient, oral nutrition supplements) at home noted. No noted major/significant wt changes, reports wt stability at current wt. Dosing wt: ~123.5 pounds, Ideal body weight: 115 pounds, pt is ~107% of ideal body weight. No noted GI upset such as nausea, vomiting, diarrhea, constipation, last BM on 2/17/25, no noted distention. No documented edema. Skin: surgical incisions to the spine, no pressure ulcers noted. Catracho: 20. No issues chewing or swallowing noted. Noted with some pain relief. Labs reviewed: no abnormal nutrition-related labs at this time; RD to continue to monitor trends. Nutritionally pertinent medications/supplements: IV fluids, zofran, senna, MIRALAX. Observed pt with no overt signs of muscle or fat wasting. Based on ASPEN guidelines, pt does not meet criteria for malnutrition at this time. Pt amenable to education; RD provided education in regards to the importance of adequate macro and micronutrients, as well as hydration to support ADLs, maintain energy levels and overall functional/nutritional status. General healthful education provided. Nutrient-dense foods promoted. Pt's diet reviewed. RD discussed pt's elevated nutrient needs related to postoperative demands, emphasizing the role that protein plays in the healing process. Pt appeared overall receptive and verbalized understanding. No additional nutrition-related concerns. Will continue to follow. Additional nutrition recommendations below to follow.

## 2025-02-18 NOTE — DIETITIAN INITIAL EVALUATION ADULT - ADD RECOMMEND
1. Continue with current diet order   **Provide additional nourishments and/or oral nutrition supplements per pt preferences and/or if pt's PO intakes are consistently <50%**  2. Encourage pt to meet nutritional needs as able   3. Monitor PO intakes, trend weights (weekly), monitor skin integrity, monitor labs (electrolytes, CMP), monitor GI function  4. Encourage adherence to diet education (reinforce as able)  5. Pain and bowel regimen per team   6. Will continue to assess/honor preferences as able   7. Align nutrition interventions with goals of care at all times

## 2025-02-18 NOTE — DIETITIAN INITIAL EVALUATION ADULT - OTHER CALCULATIONS
Pt is within ~% ideal body weight, thus actual body weight used for all calculations. Needs adjusted for advanced age, physiological demands, postoperative status.

## 2025-02-18 NOTE — PROGRESS NOTE ADULT - SUBJECTIVE AND OBJECTIVE BOX
Patient is a 80y old  Female who presents with a chief complaint of Low back pain (18 Feb 2025 05:59).    Patient seen and examined today. She denies headache, cough, dyspnea, chest pain, abdominal pain, vomiting, diarrhea, dysuria. She reports a blister on her lower lip which happens when she gets cold sores every once in a while.     Allergies    Toradol (Rash)    Last Bowel Movement: 17-Feb-2025 (02-18-25 @ 08:20)    MEDICATIONS  (STANDING):  acetaminophen     Tablet .. 1000 milliGRAM(s) Oral every 8 hours  acyclovir   Oral Tab/Cap 400 milliGRAM(s) Oral three times a day  BUpivacaine liposome 1.3% Injectable (no eMAR) 20 milliLiter(s) Local Injection once  enoxaparin Injectable 40 milliGRAM(s) SubCutaneous every 24 hours  ezetimibe 10 milliGRAM(s) Oral daily  lactated ringers. 1000 milliLiter(s) (100 mL/Hr) IV Continuous <Continuous>  losartan 50 milliGRAM(s) Oral daily  methocarbamol 500 milliGRAM(s) Oral every 8 hours  ondansetron   Disintegrating Tablet 4 milliGRAM(s) Oral every 6 hours  pantoprazole    Tablet 40 milliGRAM(s) Oral before breakfast  polyethylene glycol 3350 17 Gram(s) Oral daily  pregabalin 50 milliGRAM(s) Oral three times a day  rosuvastatin 40 milliGRAM(s) Oral at bedtime  senna 2 Tablet(s) Oral at bedtime  sucralfate 2 Gram(s) Oral once    MEDICATIONS  (PRN):  HYDROmorphone  Injectable 0.5 milliGRAM(s) IV Push every 15 minutes PRN breakthrough pain in the PACU  HYDROmorphone  Injectable 0.5 milliGRAM(s) IV Push every 4 hours PRN breakthrough pain  metoclopramide Injectable 10 milliGRAM(s) IV Push every 8 hours PRN Refractory Nausea and/or Vomiting  oxyCODONE    IR 7.5 milliGRAM(s) Oral every 4 hours PRN Severe Pain (7 - 10)  oxyCODONE    IR 5 milliGRAM(s) Oral every 4 hours PRN Moderate Pain (4 - 6)    Vital Signs Last 24 Hrs  T(C): 36.6 (02-18-25 @ 08:20), Max: 39.1 (02-17-25 @ 13:29)  T(F): 97.8 (02-18-25 @ 08:20), Max: 102.4 (02-17-25 @ 13:29)  HR: 64 (02-18-25 @ 08:20) (64 - 98)  BP: 113/70 (02-18-25 @ 08:20) (112/64 - 125/66)  BP(mean): --  RR: 18 (02-18-25 @ 08:20) (16 - 18)  SpO2: 94% (02-18-25 @ 08:20) (94% - 98%)    I&O's Summary    Oxygen Saturation Index= Unable to calculate   [Based on FiO2 = Unknown, SpO2 = 94(02/18/2025 08:20), MAP = Unknown]    PHYSICAL EXAM:  GENERAL: NAD  HEAD:  Atraumatic, Normocephalic  EYES: EOMI, conjunctiva and sclera clear  ENMT: Moist mucous membranes, no pharyngeal exudates; blister noted on the lower lip  NECK: Supple, No JVD  NERVOUS SYSTEM:  Alert & Oriented X3, Moves extremities  CHEST/LUNG: Clear to auscultation bilaterally; No rales, rhonchi, wheezing, or rubs  HEART: Regular rate and rhythm; Normal S1 and S2  ABDOMEN: Soft, Nontender, Nondistended; Bowel sounds present  EXTREMITIES:  back dressing c/d/i; 2+ Peripheral Pulses, No clubbing, cyanosis, or edema  PSYCH: Normal mood and affect    Investigations:                        9.1    14.80 )-----------( 418      ( 18 Feb 2025 06:24 )             29.5     02-18    140  |  102  |  11  ----------------------------<  128[H]  4.2   |  28  |  0.69    Ca    9.1      18 Feb 2025 06:24

## 2025-02-18 NOTE — DIETITIAN INITIAL EVALUATION ADULT - WEIGHT (LBS)
Patient Education     Acute Otitis Media with Infection (Child)    Your child has a middle ear infection (acute otitis media). It is caused by bacteria or fungi. The middle ear is the space behind the eardrum. The eustachian tube connects the ear to the nasal passage. The eustachian tubes help drain fluid from the ears. They also keep the air pressure equal inside and outside the ears. These tubes are shorter and more horizontal in children. This makes it more likely for the tubes to become blocked. A blockage lets fluid and pressure build up in the middle ear. Bacteria or fungi can grow in this fluid and cause an ear infection. This infection is commonly known as an earache.  The main symptom of an ear infection is ear pain. Other symptoms may include pulling at the ear, being more fussy than usual, decreased appetite, and vomiting or diarrhea. Your child’s hearing may also be affected. Your child may have had a respiratory infection first.  An ear infection may clear up on its own. Or your child may need to take medicine. After the infection goes away, your child may still have fluid in the middle ear. It may take weeks or months for this fluid to go away. During that time, your child may have temporary hearing loss. But all other symptoms of the earache should be gone.  Home care  Follow these guidelines when caring for your child at home:  · The healthcare provider will likely prescribe medicines for pain. The provider may also prescribe antibiotics or antifungals to treat the infection. These may be liquid medicines to give by mouth. Or they may be ear drops. Follow the provider’s instructions for giving these medicines to your child.  · Because ear infections can clear up on their own, the provider may suggest waiting for a few days before giving your child medicines for infection.  · To reduce pain, have your child rest in an upright position. Hot or cold compresses held against the ear may help ease  pain.  · Keep the ear dry. Have your child wear a shower cap when bathing.  To help prevent future infections:  · Don't smoke near your child. Secondhand smoke raises the risk for ear infections in children.  · Make sure your child gets all appropriate vaccines.  · Do not bottle-feed while your baby is lying on his or her back. (This position can cause middle ear infections because it allows milk to run into the eustachian tubes.)      · If you breastfeed, continue until your child is 6 to 12 months of age.  To apply ear drops:  1. Put the bottle in warm water if the medicine is kept in the refrigerator. Cold drops in the ear are uncomfortable.  2. Have your child lie down on a flat surface. Gently hold your child’s head to 1 side.  3. Remove any drainage from the ear with a clean tissue or cotton swab. Clean only the outer ear. Don’t put the cotton swab into the ear canal.  4. Straighten the ear canal by gently pulling the earlobe up and back.  5. Keep the dropper a half-inch above the ear canal. This will keep the dropper from becoming contaminated. Put the drops against the side of the ear canal.  6. Have your child stay lying down for 2 to 3 minutes. This gives time for the medicine to enter the ear canal. If your child doesn’t have pain, gently massage the outer ear near the opening.  7. Wipe any extra medicine away from the outer ear with a clean cotton ball.  Follow-up care  Follow up with your child’s healthcare provider as directed. Your child will need to have the ear rechecked to make sure the infection has gone away. Check with the healthcare provider to see when they want to see your child.  Special note to parents  If your child continues to get earaches, he or she may need ear tubes. The provider will put small tubes in your child’s eardrum to help keep fluid from building up. This procedure is a simple and works well.  When to seek medical advice  Unless advised otherwise, call your child's  healthcare provider if:  · Your child is 3 months old or younger and has a fever of 100.4°F (38°C) or higher. Your child may need to see a healthcare provider.  · Your child is of any age and has fevers higher than 104°F (40°C) that come back again and again.  Call your child's healthcare provider for any of the following:  · New symptoms, especially swelling around the ear or weakness of face muscles  · Severe pain  · Infection seems to get worse, not better   · Neck pain  · Your child acts very sick or not himself or herself  · Fever or pain do not improve with antibiotics after 48 hours  Date Last Reviewed: 10/1/2017  © 5672-0423 [employer]. [address] [place] [address], [place] [address]. All rights reserved. This information is not intended as a substitute for professional medical care. Always follow your healthcare professional's instructions.            123.4

## 2025-02-18 NOTE — DIETITIAN INITIAL EVALUATION ADULT - PERSON TAUGHT/METHOD
Pt amenable to education; RD provided education in regards to the importance of adequate macro and micronutrients, as well as hydration to support ADLs, maintain energy levels and overall functional/nutritional status. General healthful education provided. Nutrient-dense foods promoted. Pt's diet reviewed. RD discussed pt's elevated nutrient needs related to postoperative demands, emphasizing the role that protein plays in the healing process. Pt appeared overall receptive and verbalized understanding./verbal instruction/patient instructed

## 2025-02-18 NOTE — PROGRESS NOTE ADULT - SUBJECTIVE AND OBJECTIVE BOX
POST OPERATIVE DAY #6 L4-L5 laminectomy, PSF, TLIF and L3-L4 laminectomy   SUBJECTIVE: Patient seen and examined.  Pt without complaints. Notes she had fever and chills yesterday afternoon into evening, and felt sweats overnight. Feeling much better today. Notes developed a cold sore overnight.   Denies chest pain/SOB/dizziness/n/v/HA. Pain well controlled.       OBJECTIVE:   Vital Signs Last 24 Hrs  T(C): 36.6 (18 Feb 2025 08:20), Max: 39.1 (17 Feb 2025 15:18)  T(F): 97.8 (18 Feb 2025 08:20), Max: 102.3 (17 Feb 2025 15:18)  HR: 64 (18 Feb 2025 08:20) (64 - 98)  BP: 113/70 (18 Feb 2025 08:20) (112/64 - 125/66)  BP(mean): --  RR: 18 (18 Feb 2025 08:20) (16 - 18)  SpO2: 94% (18 Feb 2025 08:20) (94% - 98%)    Parameters below as of 18 Feb 2025 08:20  Patient On (Oxygen Delivery Method): room air    PE:  Comfortable, pleasant, alert and oriented x3, temperature appropriate          +Cold sore blister noted to bottom lip          Dressing: clean/dry/intact- gauze/paper tape          Sensation: intact to light touch to patient's baseline BLE          Motor exam:  firing ehl/ta/gs/fhl 5/5 BLE          Skin warm, well-perfused; capillary refill brisk BLE              LABS:                        9.1    14.80 )-----------( 418      ( 18 Feb 2025 06:24 )             29.5     02-18    140  |  102  |  11  ----------------------------<  128[H]  4.2   |  28  |  0.69    Ca    9.1      18 Feb 2025 06:24    Urinalysis Basic - ( 18 Feb 2025 06:24 )  Color: x / Appearance: x / SG: x / pH: x  Gluc: 128 mg/dL / Ketone: x  / Bili: x / Urobili: x   Blood: x / Protein: x / Nitrite: x   Leuk Esterase: x / RBC: x / WBC x   Sq Epi: x / Non Sq Epi: x / Bacteria: x    ASSESSMENT AND PLAN:  POD 6 L4-L5 laminectomy, PSF, TLIF and L3-L4 laminectomy on 2/12/25, with postop fever yesterday, now with cold sore, fever work up negative thus far   1. Stable. Afebrile today. Blood cultures from fever work up pending. Rest of fever work up negative so far. Continue to monitor labs and VS.   2. Analgesic pain control  3. DVT prophylaxis: SCDs, lovenox   4. Weight Bearing Status:    5. Disposition: home- cleared by PT, monitoring for fever

## 2025-02-19 LAB
ANION GAP SERPL CALC-SCNC: 13 MMOL/L — SIGNIFICANT CHANGE UP (ref 5–17)
BUN SERPL-MCNC: 13 MG/DL — SIGNIFICANT CHANGE UP (ref 7–23)
CALCIUM SERPL-MCNC: 8.9 MG/DL — SIGNIFICANT CHANGE UP (ref 8.4–10.5)
CHLORIDE SERPL-SCNC: 103 MMOL/L — SIGNIFICANT CHANGE UP (ref 96–108)
CO2 SERPL-SCNC: 25 MMOL/L — SIGNIFICANT CHANGE UP (ref 22–31)
CREAT SERPL-MCNC: 0.64 MG/DL — SIGNIFICANT CHANGE UP (ref 0.5–1.3)
EGFR: 89 ML/MIN/1.73M2 — SIGNIFICANT CHANGE UP
GLUCOSE SERPL-MCNC: 107 MG/DL — HIGH (ref 70–99)
HCT VFR BLD CALC: 29.5 % — LOW (ref 34.5–45)
HGB BLD-MCNC: 9.1 G/DL — LOW (ref 11.5–15.5)
MCHC RBC-ENTMCNC: 28.6 PG — SIGNIFICANT CHANGE UP (ref 27–34)
MCHC RBC-ENTMCNC: 30.8 G/DL — LOW (ref 32–36)
MCV RBC AUTO: 92.8 FL — SIGNIFICANT CHANGE UP (ref 80–100)
NRBC BLD AUTO-RTO: 0 /100 WBCS — SIGNIFICANT CHANGE UP (ref 0–0)
PLATELET # BLD AUTO: 456 K/UL — HIGH (ref 150–400)
POTASSIUM SERPL-MCNC: 3.7 MMOL/L — SIGNIFICANT CHANGE UP (ref 3.5–5.3)
POTASSIUM SERPL-SCNC: 3.7 MMOL/L — SIGNIFICANT CHANGE UP (ref 3.5–5.3)
RBC # BLD: 3.18 M/UL — LOW (ref 3.8–5.2)
RBC # FLD: 13.2 % — SIGNIFICANT CHANGE UP (ref 10.3–14.5)
SODIUM SERPL-SCNC: 141 MMOL/L — SIGNIFICANT CHANGE UP (ref 135–145)
WBC # BLD: 10.39 K/UL — SIGNIFICANT CHANGE UP (ref 3.8–10.5)
WBC # FLD AUTO: 10.39 K/UL — SIGNIFICANT CHANGE UP (ref 3.8–10.5)

## 2025-02-19 PROCEDURE — 99233 SBSQ HOSP IP/OBS HIGH 50: CPT

## 2025-02-19 RX ADMIN — METHOCARBAMOL 500 MILLIGRAM(S): 500 TABLET, FILM COATED ORAL at 05:26

## 2025-02-19 RX ADMIN — Medication 1000 MILLIGRAM(S): at 05:26

## 2025-02-19 RX ADMIN — POLYETHYLENE GLYCOL 3350 17 GRAM(S): 17 POWDER, FOR SOLUTION ORAL at 14:11

## 2025-02-19 RX ADMIN — Medication 1000 MILLIGRAM(S): at 13:52

## 2025-02-19 RX ADMIN — OXYCODONE HYDROCHLORIDE 7.5 MILLIGRAM(S): 30 TABLET ORAL at 16:51

## 2025-02-19 RX ADMIN — OXYCODONE HYDROCHLORIDE 7.5 MILLIGRAM(S): 30 TABLET ORAL at 17:51

## 2025-02-19 RX ADMIN — ENOXAPARIN SODIUM 40 MILLIGRAM(S): 100 INJECTION SUBCUTANEOUS at 21:27

## 2025-02-19 RX ADMIN — ROSUVASTATIN CALCIUM 40 MILLIGRAM(S): 20 TABLET, FILM COATED ORAL at 23:55

## 2025-02-19 RX ADMIN — Medication 1000 MILLIGRAM(S): at 06:26

## 2025-02-19 RX ADMIN — EZETIMIBE 10 MILLIGRAM(S): 10 TABLET ORAL at 12:27

## 2025-02-19 RX ADMIN — OXYCODONE HYDROCHLORIDE 7.5 MILLIGRAM(S): 30 TABLET ORAL at 10:47

## 2025-02-19 RX ADMIN — Medication 40 MILLIGRAM(S): at 05:26

## 2025-02-19 RX ADMIN — OXYCODONE HYDROCHLORIDE 7.5 MILLIGRAM(S): 30 TABLET ORAL at 02:41

## 2025-02-19 RX ADMIN — Medication 2 TABLET(S): at 21:27

## 2025-02-19 RX ADMIN — OXYCODONE HYDROCHLORIDE 5 MILLIGRAM(S): 30 TABLET ORAL at 10:50

## 2025-02-19 RX ADMIN — OXYCODONE HYDROCHLORIDE 7.5 MILLIGRAM(S): 30 TABLET ORAL at 06:37

## 2025-02-19 RX ADMIN — METHOCARBAMOL 500 MILLIGRAM(S): 500 TABLET, FILM COATED ORAL at 13:53

## 2025-02-19 RX ADMIN — OXYCODONE HYDROCHLORIDE 7.5 MILLIGRAM(S): 30 TABLET ORAL at 22:27

## 2025-02-19 RX ADMIN — OXYCODONE HYDROCHLORIDE 7.5 MILLIGRAM(S): 30 TABLET ORAL at 03:41

## 2025-02-19 RX ADMIN — Medication 400 MILLIGRAM(S): at 05:26

## 2025-02-19 RX ADMIN — OXYCODONE HYDROCHLORIDE 7.5 MILLIGRAM(S): 30 TABLET ORAL at 11:50

## 2025-02-19 RX ADMIN — METHOCARBAMOL 500 MILLIGRAM(S): 500 TABLET, FILM COATED ORAL at 21:27

## 2025-02-19 RX ADMIN — Medication 400 MILLIGRAM(S): at 13:52

## 2025-02-19 RX ADMIN — OXYCODONE HYDROCHLORIDE 7.5 MILLIGRAM(S): 30 TABLET ORAL at 21:27

## 2025-02-19 RX ADMIN — Medication 400 MILLIGRAM(S): at 21:27

## 2025-02-19 RX ADMIN — PREGABALIN 50 MILLIGRAM(S): 50 CAPSULE ORAL at 05:26

## 2025-02-19 RX ADMIN — PREGABALIN 50 MILLIGRAM(S): 50 CAPSULE ORAL at 21:27

## 2025-02-19 RX ADMIN — Medication 1000 MILLIGRAM(S): at 21:27

## 2025-02-19 RX ADMIN — PREGABALIN 50 MILLIGRAM(S): 50 CAPSULE ORAL at 13:53

## 2025-02-19 NOTE — PROGRESS NOTE ADULT - SUBJECTIVE AND OBJECTIVE BOX
POST OPERATIVE DAY # 6 L4-L5 laminectomy, PSF, TLIF and L3-L4 laminectomy   SUBJECTIVE: Patient seen and examined.  Pt without complaints.   Denies chest pain/SOB/dizziness/n/v/HA   Pain well controlled.       OBJECTIVE:   Vital Signs Last 24 Hrs  T(C): 36.8 (19 Feb 2025 09:13), Max: 37.1 (18 Feb 2025 20:40)  T(F): 98.2 (19 Feb 2025 09:13), Max: 98.7 (18 Feb 2025 20:40)  HR: 89 (19 Feb 2025 09:13) (76 - 90)  BP: 144/79 (19 Feb 2025 09:13) (115/68 - 144/79)  BP(mean): 101 (19 Feb 2025 09:13) (83 - 101)  RR: 16 (19 Feb 2025 09:13) (16 - 18)  SpO2: 95% (19 Feb 2025 09:13) (94% - 97%)    Parameters below as of 19 Feb 2025 09:13  Patient On (Oxygen Delivery Method): room air    PE:  Comfortable, pleasant, alert and oriented x3, temperature appropriate          +Cold sore blister noted to bottom lip          Dressing: clean/dry/intact- gauze/paper tape          Sensation: intact to light touch to patient's baseline BLE          Motor exam:  firing ehl/ta/gs/fhl 5/5 BLE          Skin warm, well-perfused; capillary refill brisk BLE              LABS:                        9.1    10.39 )-----------( 456      ( 19 Feb 2025 08:12 )             29.5     02-19    141  |  103  |  13  ----------------------------<  107[H]  3.7   |  25  |  0.64    Ca    8.9      19 Feb 2025 08:12    Urinalysis Basic - ( 19 Feb 2025 08:12 )  Color: x / Appearance: x / SG: x / pH: x  Gluc: 107 mg/dL / Ketone: x  / Bili: x / Urobili: x   Blood: x / Protein: x / Nitrite: x   Leuk Esterase: x / RBC: x / WBC x   Sq Epi: x / Non Sq Epi: x / Bacteria: x    ASSESSMENT AND PLAN: POD 7 L4-L5 laminectomy, PSF, TLIF, L3-L4 laminectomy on 2/12/25   1. Stable. Afebrile today. Blood cultures from fever work up pending. Rest of fever work up negative. Continue to monitor labs and VS   2. Analgesic pain control  3. DVT prophylaxis: SCDs, lovenox   4. Weight Bearing Status:  WBAT   5. Disposition: home- cleared by PT, awaiting blood cultures, will plan for d/c in AM- d/w Dr Johnson

## 2025-02-19 NOTE — PROGRESS NOTE ADULT - SUBJECTIVE AND OBJECTIVE BOX
Patient is a 80y old  Female who presents with a chief complaint of Low back pain (19 Feb 2025 06:36).    Patient seen and examined today. She reports that her back pain is controlled by her pain medications. She denies fever, chills, cough, sore throat, dysuria, diarrhea, abdominal pain.     Allergies    Toradol (Rash)    Last Bowel Movement: 18-Feb-2025 (02-19-25 @ 09:13)    MEDICATIONS  (STANDING):  acetaminophen     Tablet .. 1000 milliGRAM(s) Oral every 8 hours  acyclovir   Oral Tab/Cap 400 milliGRAM(s) Oral three times a day  BUpivacaine liposome 1.3% Injectable (no eMAR) 20 milliLiter(s) Local Injection once  enoxaparin Injectable 40 milliGRAM(s) SubCutaneous every 24 hours  ezetimibe 10 milliGRAM(s) Oral daily  lactated ringers. 1000 milliLiter(s) (100 mL/Hr) IV Continuous <Continuous>  losartan 50 milliGRAM(s) Oral daily  methocarbamol 500 milliGRAM(s) Oral every 8 hours  ondansetron   Disintegrating Tablet 4 milliGRAM(s) Oral every 6 hours  pantoprazole    Tablet 40 milliGRAM(s) Oral before breakfast  polyethylene glycol 3350 17 Gram(s) Oral daily  pregabalin 50 milliGRAM(s) Oral three times a day  rosuvastatin 40 milliGRAM(s) Oral at bedtime  senna 2 Tablet(s) Oral at bedtime  sucralfate 2 Gram(s) Oral once    MEDICATIONS  (PRN):  HYDROmorphone  Injectable 0.5 milliGRAM(s) IV Push every 4 hours PRN breakthrough pain  HYDROmorphone  Injectable 0.5 milliGRAM(s) IV Push every 15 minutes PRN breakthrough pain in the PACU  metoclopramide Injectable 10 milliGRAM(s) IV Push every 8 hours PRN Refractory Nausea and/or Vomiting  oxyCODONE    IR 5 milliGRAM(s) Oral every 4 hours PRN Moderate Pain (4 - 6)  oxyCODONE    IR 7.5 milliGRAM(s) Oral every 4 hours PRN Severe Pain (7 - 10)      Vital Signs Last 24 Hrs  T(C): 36.8 (02-19-25 @ 09:13), Max: 37.1 (02-18-25 @ 20:40)  T(F): 98.2 (02-19-25 @ 09:13), Max: 98.7 (02-18-25 @ 20:40)  HR: 89 (02-19-25 @ 09:13) (76 - 90)  BP: 144/79 (02-19-25 @ 09:13) (115/68 - 144/79)  BP(mean): 101 (02-19-25 @ 09:13) (83 - 101)  RR: 16 (02-19-25 @ 09:13) (16 - 18)  SpO2: 95% (02-19-25 @ 09:13) (94% - 97%)      I&O's Summary    Oxygen Saturation Index= Unable to calculate   [Based on FiO2 = Unknown, SpO2 = 95(02/19/2025 09:13), MAP = Unknown]    PHYSICAL EXAM:  GENERAL: NAD  HEAD:  Atraumatic, Normocephalic  EYES: EOMI, conjunctiva and sclera clear  ENMT: Moist mucous membranes, no pharyngeal exudates; blister noted on the lower lip which is now drying up and crusting over  NECK: Supple, No JVD  NERVOUS SYSTEM:  Alert & Oriented X3, Moves extremities  CHEST/LUNG: Clear to auscultation bilaterally; No rales, rhonchi, wheezing, or rubs  HEART: Regular rate and rhythm; Normal S1 and S2  ABDOMEN: Soft, Nontender, Nondistended; Bowel sounds present  EXTREMITIES:  back dressing c/d/i; 2+ Peripheral Pulses  PSYCH: Normal mood and affect    Investigations:                        9.1    10.39 )-----------( 456      ( 19 Feb 2025 08:12 )             29.5     02-19    141  |  103  |  13  ----------------------------<  107[H]  3.7   |  25  |  0.64    Ca    8.9      19 Feb 2025 08:12

## 2025-02-19 NOTE — PROGRESS NOTE ADULT - SUBJECTIVE AND OBJECTIVE BOX
Ortho Note    Pt comfortable without complaints, pain controlled  Denies CP, SOB, N/V, numbness/tingling     Vital Signs Last 24 Hrs  T(C): 36.6 (02-19-25 @ 05:27), Max: 36.6 (02-19-25 @ 05:27)  T(F): 97.8 (02-19-25 @ 05:27), Max: 97.8 (02-19-25 @ 05:27)  HR: 76 (02-19-25 @ 05:27) (76 - 76)  BP: 116/67 (02-19-25 @ 05:27) (116/67 - 116/67)  BP(mean): --  RR: 18 (02-19-25 @ 05:27) (18 - 18)  SpO2: 96% (02-19-25 @ 05:27) (96% - 96%)  I&O's Summary      AFVSS past 24 hours  General: Pt Alert and oriented, NAD  DSG C/D/I  Pulses: 2+  Sensation: SILT  Motor: 5/5 Quad/Ham/EHL/FHL/TA/GS                          9.1    10.39 )-----------( 456      ( 19 Feb 2025 08:12 )             29.5     02-19    141  |  103  |  13  ----------------------------<  107[H]  3.7   |  25  |  0.64    Ca    8.9      19 Feb 2025 08:12      A/P: 80yFemale POD#7 s/p L4-5 lami/PSF/TLIF  - Stable  - Pain Control  -Appreciate med recs  - DVT ppx: SCds, lovenox  - PT, WBS: WBAT  - Cleared PT  - Infectious work up RVP, covid, Bcx x2, UA  - Dispo Pending med clearance; cleared PT      Ortho Pager [phone]

## 2025-02-19 NOTE — PROGRESS NOTE ADULT - ASSESSMENT
Ms. Layla Grier is an 80/F with chronic lower back pain, lumbar spinal stenosis, HLD, and HTN who presented for an elective L3-L4 laminectomy, PSF, TLIF.    Recommendations:    #Lumbar spinal stenosis   #Post op state  - Provide adequate analgesia, Incentive spirometry, mobilize with fall precautions, bowel regimen, DVT prophylaxis  - pain regimen: Tylenol, Methocarbamol, Pregabalin and PRN Oxycodone and Hydromorphone  - bowel regimen: Miralax and Senna   - PT/OT    #HLD   - continue Rosuvastatin 40 mg daily     #HTN   - continue Losartan 50 mg daily with BP parameters  - monitor for orthostatic hypotension    #Leukocytosis  #Transient fever, now improved  #Herpes simplex labialis  - only reports development of cold sore on lower lip and no other symptoms suggestive of infection; blister on lower lip improving   - WBC trending down, RVP negative, CXR clear lungs, UA no bacteria  - on Acyclovir 400 mg PO q8h x 5 days for herpes simplex recurrence; patient educated about risks/side effects and benefits of medications  - blood cultures sent on 2/18 and in progress and need to be followed    #Acute blood loss anemia  - no reported bleeding symptoms  - Monitor Hgb  - Transfuse for Hgb < 7  - Ensure Type and Screen available    DVT ppx with Lovenox 40 mg daily  Dispo: HPT    Feel free to reach out for any questions. Recommendations discussed with primary team.

## 2025-02-20 ENCOUNTER — NON-APPOINTMENT (OUTPATIENT)
Age: 80
End: 2025-02-20

## 2025-02-20 VITALS
OXYGEN SATURATION: 95 % | RESPIRATION RATE: 16 BRPM | DIASTOLIC BLOOD PRESSURE: 81 MMHG | TEMPERATURE: 99 F | HEART RATE: 82 BPM | SYSTOLIC BLOOD PRESSURE: 145 MMHG

## 2025-02-20 PROCEDURE — 99232 SBSQ HOSP IP/OBS MODERATE 35: CPT

## 2025-02-20 PROCEDURE — 86900 BLOOD TYPING SEROLOGIC ABO: CPT

## 2025-02-20 PROCEDURE — 85027 COMPLETE CBC AUTOMATED: CPT

## 2025-02-20 PROCEDURE — 81001 URINALYSIS AUTO W/SCOPE: CPT

## 2025-02-20 PROCEDURE — 80048 BASIC METABOLIC PNL TOTAL CA: CPT

## 2025-02-20 PROCEDURE — C1889: CPT

## 2025-02-20 PROCEDURE — 76000 FLUOROSCOPY <1 HR PHYS/QHP: CPT

## 2025-02-20 PROCEDURE — 0225U NFCT DS DNA&RNA 21 SARSCOV2: CPT

## 2025-02-20 PROCEDURE — 86901 BLOOD TYPING SEROLOGIC RH(D): CPT

## 2025-02-20 PROCEDURE — 87040 BLOOD CULTURE FOR BACTERIA: CPT

## 2025-02-20 PROCEDURE — 97162 PT EVAL MOD COMPLEX 30 MIN: CPT

## 2025-02-20 PROCEDURE — 85025 COMPLETE CBC W/AUTO DIFF WBC: CPT

## 2025-02-20 PROCEDURE — C1713: CPT

## 2025-02-20 PROCEDURE — 71045 X-RAY EXAM CHEST 1 VIEW: CPT

## 2025-02-20 PROCEDURE — 97116 GAIT TRAINING THERAPY: CPT

## 2025-02-20 PROCEDURE — 36415 COLL VENOUS BLD VENIPUNCTURE: CPT

## 2025-02-20 PROCEDURE — C9399: CPT

## 2025-02-20 PROCEDURE — 86850 RBC ANTIBODY SCREEN: CPT

## 2025-02-20 PROCEDURE — 97165 OT EVAL LOW COMPLEX 30 MIN: CPT

## 2025-02-20 RX ADMIN — OXYCODONE HYDROCHLORIDE 7.5 MILLIGRAM(S): 30 TABLET ORAL at 01:30

## 2025-02-20 RX ADMIN — Medication 40 MILLIGRAM(S): at 05:31

## 2025-02-20 RX ADMIN — OXYCODONE HYDROCHLORIDE 7.5 MILLIGRAM(S): 30 TABLET ORAL at 05:32

## 2025-02-20 RX ADMIN — OXYCODONE HYDROCHLORIDE 7.5 MILLIGRAM(S): 30 TABLET ORAL at 11:08

## 2025-02-20 RX ADMIN — OXYCODONE HYDROCHLORIDE 7.5 MILLIGRAM(S): 30 TABLET ORAL at 10:08

## 2025-02-20 RX ADMIN — Medication 1000 MILLIGRAM(S): at 05:31

## 2025-02-20 RX ADMIN — METHOCARBAMOL 500 MILLIGRAM(S): 500 TABLET, FILM COATED ORAL at 05:31

## 2025-02-20 RX ADMIN — OXYCODONE HYDROCHLORIDE 7.5 MILLIGRAM(S): 30 TABLET ORAL at 06:32

## 2025-02-20 RX ADMIN — OXYCODONE HYDROCHLORIDE 7.5 MILLIGRAM(S): 30 TABLET ORAL at 02:30

## 2025-02-20 RX ADMIN — PREGABALIN 50 MILLIGRAM(S): 50 CAPSULE ORAL at 05:31

## 2025-02-20 RX ADMIN — Medication 400 MILLIGRAM(S): at 05:33

## 2025-02-20 NOTE — PROGRESS NOTE ADULT - PROVIDER SPECIALTY LIST ADULT
Hospitalist
Hospitalist
Orthopedics
Hospitalist
Hospitalist
Orthopedics
Hospitalist

## 2025-02-20 NOTE — PROGRESS NOTE ADULT - SUBJECTIVE AND OBJECTIVE BOX
Ortho Note    Pt comfortable without complaints, pain controlled  Denies CP, SOB, N/V, numbness/tingling     Vital Signs Last 24 Hrs  T(C): 37.2 (02-20-25 @ 04:55), Max: 37.2 (02-20-25 @ 04:55)  T(F): 98.9 (02-20-25 @ 04:55), Max: 98.9 (02-20-25 @ 04:55)  HR: 80 (02-20-25 @ 04:55) (80 - 80)  BP: 132/70 (02-20-25 @ 04:55) (132/70 - 132/70)  BP(mean): --  RR: 17 (02-20-25 @ 04:55) (17 - 17)  SpO2: 95% (02-20-25 @ 04:55) (95% - 95%)  I&O's Summary      AFVSS past 24 hours  General: Pt Alert and oriented, NAD  DSG C/D/I  Pulses: 2+  Sensation: SILT  Motor: 5/5 Quad/Ham/EHL/FHL/TA/GS                          9.1    10.39 )-----------( 456      ( 19 Feb 2025 08:12 )             29.5     02-19    141  |  103  |  13  ----------------------------<  107[H]  3.7   |  25  |  0.64    Ca    8.9      19 Feb 2025 08:12      A/P: 80yFemale POD#7 s/p L4-5 lami/PSF/TLIF  - Stable  - Pain Control  -Appreciate med recs  - DVT ppx: SCds, lovenox  - PT, WBS: WBAT  - Cleared PT  - Infectious work up RVP, covid, Bcx x2, UA  - Drain: HV – d/c’d  Dispo: Cleared PT, HPT  w/ Acyclovir 400 mg PO q8h x 5 days for herpes simplex recurrence    Ortho Pager [phone] = No assist / stand by assistance

## 2025-02-20 NOTE — PROGRESS NOTE ADULT - SUBJECTIVE AND OBJECTIVE BOX
Patient is a 80y old  Female who presents with a chief complaint of Low back pain (20 Feb 2025 06:51).    Patient seen and examined today. She feels well and denies fever, chills, worsening back pain, vomiting, chest pain, dyspnea.     Allergies    Toradol (Rash)    Last Bowel Movement: 18-Feb-2025 (02-20-25 @ 08:29)    MEDICATIONS  (STANDING):  acetaminophen     Tablet .. 1000 milliGRAM(s) Oral every 8 hours  acyclovir   Oral Tab/Cap 400 milliGRAM(s) Oral three times a day  BUpivacaine liposome 1.3% Injectable (no eMAR) 20 milliLiter(s) Local Injection once  enoxaparin Injectable 40 milliGRAM(s) SubCutaneous every 24 hours  ezetimibe 10 milliGRAM(s) Oral daily  lactated ringers. 1000 milliLiter(s) (100 mL/Hr) IV Continuous <Continuous>  losartan 50 milliGRAM(s) Oral daily  methocarbamol 500 milliGRAM(s) Oral every 8 hours  ondansetron   Disintegrating Tablet 4 milliGRAM(s) Oral every 6 hours  pantoprazole    Tablet 40 milliGRAM(s) Oral before breakfast  polyethylene glycol 3350 17 Gram(s) Oral daily  pregabalin 50 milliGRAM(s) Oral three times a day  rosuvastatin 40 milliGRAM(s) Oral at bedtime  senna 2 Tablet(s) Oral at bedtime  sucralfate 2 Gram(s) Oral once    MEDICATIONS  (PRN):  metoclopramide Injectable 10 milliGRAM(s) IV Push every 8 hours PRN Refractory Nausea and/or Vomiting  oxyCODONE    IR 5 milliGRAM(s) Oral every 4 hours PRN Moderate Pain (4 - 6)  oxyCODONE    IR 7.5 milliGRAM(s) Oral every 4 hours PRN Severe Pain (7 - 10)    Vital Signs Last 24 Hrs  T(C): 37.3 (02-20-25 @ 08:29), Max: 37.3 (02-19-25 @ 19:58)  T(F): 99.2 (02-20-25 @ 08:29), Max: 99.2 (02-19-25 @ 19:58)  HR: 82 (02-20-25 @ 08:29) (80 - 86)  BP: 145/81 (02-20-25 @ 08:29) (127/70 - 145/81)  BP(mean): 102 (02-20-25 @ 08:29) (102 - 102)  RR: 16 (02-20-25 @ 08:29) (16 - 18)  SpO2: 95% (02-20-25 @ 08:29) (95% - 96%)    I&O's Summary    Oxygen Saturation Index= Unable to calculate   [Based on FiO2 = Unknown, SpO2 = 95(02/20/2025 08:29), MAP = Unknown]    PHYSICAL EXAM:  GENERAL: NAD  HEAD:  Atraumatic, Normocephalic  EYES: EOMI, conjunctiva and sclera clear  ENMT: Moist mucous membranes, no pharyngeal exudates; blister noted on the lower lip crusting over  NECK: Supple, No JVD  NERVOUS SYSTEM:  Alert & Oriented X3, Moves extremities  CHEST/LUNG: Clear to auscultation bilaterally  HEART: Regular rate and rhythm; Normal S1 and S2  ABDOMEN: Soft, Nontender, Nondistended; Bowel sounds present  EXTREMITIES:  back dressing c/d/i; 2+ Peripheral Pulses  PSYCH: Normal mood and affect    Investigations:                        9.1    10.39 )-----------( 456      ( 19 Feb 2025 08:12 )             29.5     02-19    141  |  103  |  13  ----------------------------<  107[H]  3.7   |  25  |  0.64    Ca    8.9      19 Feb 2025 08:12    Culture - Blood (collected 18 Feb 2025 12:10)  Source: .Blood Blood-Venous  Preliminary Report (19 Feb 2025 19:02):    No growth at 24 hours    Culture - Blood (collected 18 Feb 2025 12:10)  Source: .Blood Blood-Venous  Preliminary Report (19 Feb 2025 19:02):    No growth at 24 hours

## 2025-02-20 NOTE — PROGRESS NOTE ADULT - TIME BILLING
Review of hospital course, labs, vitals, medical records.   Bedside exam and interview   Discussed plan of care with primary team ACP and housestaff   Documenting the encounter  Excludes teaching and separately reported services
Bedside exam and interview   Reviewed vitals, labs   Discussed patient's plan of care with housestaff   Documentation of encounter
Review of chart and imaging. Evaluation of the patient. Discussion with the care team members. Documentation.
Reviewing patient chart and history, Review of patient labs and imaging, obtaining and reviewing obtained history, performing medical examination and evaluation at bedside, counseling and educting patient, family, or caregiver, ordering additional tests, therapies, treatments or communicating with other healthcare professionals, documenting, and coordination of care.    Hernando Sequeira DO, PhD  Internal Medicine, Hospitalist  [email or web address]
Bedside exam and interview   Reviewed vitals, labs   Discussed patient's plan of care with housestaff   Documentation of encounter
Bedside exam and interview   Reviewed vitals, labs   Discussed patient's plan of care with housestaff   Documentation of encounter

## 2025-02-20 NOTE — PROGRESS NOTE ADULT - ASSESSMENT
Ms. Layla Grier is an 80/F with chronic lower back pain, lumbar spinal stenosis, HLD, and HTN who presented for an elective L3-L4 laminectomy, PSF, TLIF.    Recommendations:    #Lumbar spinal stenosis   #Post op state  - Provide adequate analgesia, Incentive spirometry, mobilize with fall precautions, bowel regimen, DVT prophylaxis  - pain regimen: Tylenol, Methocarbamol, Pregabalin and PRN Oxycodone and Hydromorphone  - bowel regimen: Miralax and Senna   - PT/OT    #HLD   - continue Rosuvastatin 40 mg daily     #HTN   - continue Losartan 50 mg daily with BP parameters  - monitor for orthostatic hypotension    #Leukocytosis, improved  #Transient fever, now improved  #Herpes simplex labialis, improving  - only reports development of cold sore on lower lip and no other symptoms suggestive of infection; blister on lower lip improving   - WBC trending down, RVP negative, CXR clear lungs, UA no bacteria  - on Acyclovir 400 mg PO q8h x 5 days for herpes simplex recurrence; patient educated about risks/side effects and benefits of medications  - blood cultures sent on 2/18 negative to date and need to be followed, patient and orthopedics team aware    #Acute blood loss anemia  - no reported bleeding symptoms  - Monitor Hgb  - Transfuse for Hgb < 7  - Ensure Type and Screen available    DVT ppx with Lovenox 40 mg daily  Dispo: HPT    Feel free to reach out for any questions. Recommendations discussed with primary team.

## 2025-02-20 NOTE — PROGRESS NOTE ADULT - REASON FOR ADMISSION
Low back pain

## 2025-02-21 RX ORDER — OXYCODONE 5 MG/1
5 TABLET ORAL
Qty: 60 | Refills: 0 | Status: ACTIVE | COMMUNITY
Start: 2025-02-21 | End: 1900-01-01

## 2025-02-23 LAB
CULTURE RESULTS: SIGNIFICANT CHANGE UP
CULTURE RESULTS: SIGNIFICANT CHANGE UP
SPECIMEN SOURCE: SIGNIFICANT CHANGE UP
SPECIMEN SOURCE: SIGNIFICANT CHANGE UP

## 2025-02-26 DIAGNOSIS — E87.6 HYPOKALEMIA: ICD-10-CM

## 2025-02-26 DIAGNOSIS — Z92.3 PERSONAL HISTORY OF IRRADIATION: ICD-10-CM

## 2025-02-26 DIAGNOSIS — M53.2X6 SPINAL INSTABILITIES, LUMBAR REGION: ICD-10-CM

## 2025-02-26 DIAGNOSIS — I10 ESSENTIAL (PRIMARY) HYPERTENSION: ICD-10-CM

## 2025-02-26 DIAGNOSIS — Z79.82 LONG TERM (CURRENT) USE OF ASPIRIN: ICD-10-CM

## 2025-02-26 DIAGNOSIS — B00.1 HERPESVIRAL VESICULAR DERMATITIS: ICD-10-CM

## 2025-02-26 DIAGNOSIS — E78.5 HYPERLIPIDEMIA, UNSPECIFIED: ICD-10-CM

## 2025-02-26 DIAGNOSIS — Z85.3 PERSONAL HISTORY OF MALIGNANT NEOPLASM OF BREAST: ICD-10-CM

## 2025-02-26 DIAGNOSIS — D72.829 ELEVATED WHITE BLOOD CELL COUNT, UNSPECIFIED: ICD-10-CM

## 2025-02-26 DIAGNOSIS — M48.061 SPINAL STENOSIS, LUMBAR REGION WITHOUT NEUROGENIC CLAUDICATION: ICD-10-CM

## 2025-02-26 DIAGNOSIS — M81.0 AGE-RELATED OSTEOPOROSIS WITHOUT CURRENT PATHOLOGICAL FRACTURE: ICD-10-CM

## 2025-02-26 DIAGNOSIS — Z79.1 LONG TERM (CURRENT) USE OF NON-STEROIDAL ANTI-INFLAMMATORIES (NSAID): ICD-10-CM

## 2025-02-26 DIAGNOSIS — M54.16 RADICULOPATHY, LUMBAR REGION: ICD-10-CM

## 2025-02-26 DIAGNOSIS — M43.16 SPONDYLOLISTHESIS, LUMBAR REGION: ICD-10-CM

## 2025-02-26 DIAGNOSIS — Z79.899 OTHER LONG TERM (CURRENT) DRUG THERAPY: ICD-10-CM

## 2025-02-27 ENCOUNTER — APPOINTMENT (OUTPATIENT)
Dept: ORTHOPEDIC SURGERY | Facility: CLINIC | Age: 80
End: 2025-02-27
Payer: MEDICARE

## 2025-02-27 DIAGNOSIS — M54.50 LOW BACK PAIN, UNSPECIFIED: ICD-10-CM

## 2025-02-27 DIAGNOSIS — Z98.1 ARTHRODESIS STATUS: ICD-10-CM

## 2025-02-27 DIAGNOSIS — M54.16 RADICULOPATHY, LUMBAR REGION: ICD-10-CM

## 2025-02-27 PROCEDURE — 72100 X-RAY EXAM L-S SPINE 2/3 VWS: CPT

## 2025-02-27 PROCEDURE — 99024 POSTOP FOLLOW-UP VISIT: CPT

## 2025-02-27 RX ORDER — METHOCARBAMOL 500 MG/1
500 TABLET, FILM COATED ORAL 3 TIMES DAILY
Qty: 30 | Refills: 0 | Status: ACTIVE | COMMUNITY
Start: 2025-02-27 | End: 1900-01-01

## 2025-02-28 PROBLEM — Z98.1 HISTORY OF LUMBAR FUSION: Status: ACTIVE | Noted: 2025-02-28

## 2025-03-03 PROBLEM — M54.16 LUMBAR RADICULITIS: Status: ACTIVE | Noted: 2025-03-03

## 2025-03-03 RX ORDER — PREGABALIN 50 MG/1
50 CAPSULE ORAL
Qty: 30 | Refills: 0 | Status: ACTIVE | COMMUNITY
Start: 2025-03-03 | End: 1900-01-01

## 2025-03-27 ENCOUNTER — APPOINTMENT (OUTPATIENT)
Dept: ORTHOPEDIC SURGERY | Facility: CLINIC | Age: 80
End: 2025-03-27
Payer: MEDICARE

## 2025-03-27 DIAGNOSIS — M54.16 RADICULOPATHY, LUMBAR REGION: ICD-10-CM

## 2025-03-27 DIAGNOSIS — Z98.1 ARTHRODESIS STATUS: ICD-10-CM

## 2025-03-27 PROCEDURE — 72100 X-RAY EXAM L-S SPINE 2/3 VWS: CPT

## 2025-03-27 PROCEDURE — 99024 POSTOP FOLLOW-UP VISIT: CPT

## 2025-03-27 RX ORDER — GABAPENTIN 300 MG/1
300 CAPSULE ORAL 3 TIMES DAILY
Qty: 90 | Refills: 0 | Status: ACTIVE | COMMUNITY
Start: 2025-03-27 | End: 1900-01-01

## 2025-05-01 ENCOUNTER — APPOINTMENT (OUTPATIENT)
Dept: ORTHOPEDIC SURGERY | Facility: CLINIC | Age: 80
End: 2025-05-01

## 2025-05-08 ENCOUNTER — APPOINTMENT (OUTPATIENT)
Dept: ORTHOPEDIC SURGERY | Facility: CLINIC | Age: 80
End: 2025-05-08
Payer: MEDICARE

## 2025-05-08 VITALS
BODY MASS INDEX: 21.97 KG/M2 | OXYGEN SATURATION: 97 % | TEMPERATURE: 97.8 F | HEART RATE: 75 BPM | HEIGHT: 63 IN | DIASTOLIC BLOOD PRESSURE: 79 MMHG | SYSTOLIC BLOOD PRESSURE: 119 MMHG | WEIGHT: 124 LBS

## 2025-05-08 DIAGNOSIS — Z98.1 ARTHRODESIS STATUS: ICD-10-CM

## 2025-05-08 DIAGNOSIS — M54.16 RADICULOPATHY, LUMBAR REGION: ICD-10-CM

## 2025-05-08 PROCEDURE — 72100 X-RAY EXAM L-S SPINE 2/3 VWS: CPT

## 2025-05-08 PROCEDURE — 99024 POSTOP FOLLOW-UP VISIT: CPT

## 2025-05-08 NOTE — ED ADULT NURSE NOTE - PAIN: PRESENCE, MLM
[FreeTextEntry1] : Pt is here for follow up of multiple medical problems including   breast cancer, hyerlipidemia, htn
complains of pain/discomfort

## 2025-05-20 ENCOUNTER — OUTPATIENT (OUTPATIENT)
Dept: OUTPATIENT SERVICES | Facility: HOSPITAL | Age: 80
LOS: 1 days | End: 2025-05-20
Payer: MEDICARE

## 2025-05-20 DIAGNOSIS — Z98.890 OTHER SPECIFIED POSTPROCEDURAL STATES: Chronic | ICD-10-CM

## 2025-05-20 DIAGNOSIS — Z90.49 ACQUIRED ABSENCE OF OTHER SPECIFIED PARTS OF DIGESTIVE TRACT: Chronic | ICD-10-CM

## 2025-05-20 DIAGNOSIS — Z90.89 ACQUIRED ABSENCE OF OTHER ORGANS: Chronic | ICD-10-CM

## 2025-05-20 PROCEDURE — 73610 X-RAY EXAM OF ANKLE: CPT

## 2025-05-20 PROCEDURE — 73610 X-RAY EXAM OF ANKLE: CPT | Mod: 26,RT

## 2025-06-19 ENCOUNTER — APPOINTMENT (OUTPATIENT)
Dept: ORTHOPEDIC SURGERY | Age: 80
End: 2025-06-19
Payer: MEDICARE

## 2025-06-19 PROBLEM — M20.011 MALLET FINGER OF RIGHT HAND: Status: ACTIVE | Noted: 2025-06-19

## 2025-06-19 PROCEDURE — 99205 OFFICE O/P NEW HI 60 MIN: CPT

## 2025-07-17 ENCOUNTER — APPOINTMENT (OUTPATIENT)
Dept: ORTHOPEDIC SURGERY | Facility: CLINIC | Age: 80
End: 2025-07-17
Payer: MEDICARE

## 2025-07-17 VITALS
OXYGEN SATURATION: 98 % | TEMPERATURE: 98.4 F | HEIGHT: 63 IN | HEART RATE: 62 BPM | SYSTOLIC BLOOD PRESSURE: 127 MMHG | WEIGHT: 124 LBS | BODY MASS INDEX: 21.97 KG/M2 | DIASTOLIC BLOOD PRESSURE: 67 MMHG

## 2025-07-17 PROCEDURE — 99212 OFFICE O/P EST SF 10 MIN: CPT

## 2025-07-17 PROCEDURE — 72100 X-RAY EXAM L-S SPINE 2/3 VWS: CPT

## 2025-08-12 ENCOUNTER — NON-APPOINTMENT (OUTPATIENT)
Age: 80
End: 2025-08-12

## 2025-08-15 ENCOUNTER — APPOINTMENT (OUTPATIENT)
Dept: ORTHOPEDIC SURGERY | Facility: CLINIC | Age: 80
End: 2025-08-15
Payer: MEDICARE

## 2025-08-15 VITALS — RESPIRATION RATE: 16 BRPM | BODY MASS INDEX: 21.97 KG/M2 | HEIGHT: 63 IN | WEIGHT: 124 LBS

## 2025-08-15 DIAGNOSIS — M65.341 TRIGGER FINGER, RIGHT RING FINGER: ICD-10-CM

## 2025-08-15 PROCEDURE — 99214 OFFICE O/P EST MOD 30 MIN: CPT | Mod: 25

## 2025-08-15 PROCEDURE — 20550 NJX 1 TENDON SHEATH/LIGAMENT: CPT | Mod: F8

## 2025-08-19 ENCOUNTER — APPOINTMENT (OUTPATIENT)
Dept: ORTHOPEDIC SURGERY | Age: 80
End: 2025-08-19

## 2025-09-08 ENCOUNTER — TRANSCRIPTION ENCOUNTER (OUTPATIENT)
Age: 80
End: 2025-09-08

## 2025-09-09 ENCOUNTER — TRANSCRIPTION ENCOUNTER (OUTPATIENT)
Age: 80
End: 2025-09-09

## 2025-09-09 PROBLEM — N20.1 LEFT URETERAL STONE: Status: ACTIVE | Noted: 2025-09-09

## 2025-09-12 ENCOUNTER — APPOINTMENT (OUTPATIENT)
Dept: UROLOGY | Facility: CLINIC | Age: 80
End: 2025-09-12

## 2025-09-17 ENCOUNTER — APPOINTMENT (OUTPATIENT)
Dept: UROLOGY | Facility: AMBULATORY SURGERY CENTER | Age: 80
End: 2025-09-17

## 2025-09-17 ENCOUNTER — TRANSCRIPTION ENCOUNTER (OUTPATIENT)
Age: 80
End: 2025-09-17

## 2025-09-17 RX ORDER — PHENAZOPYRIDINE 200 MG/1
200 TABLET, FILM COATED ORAL
Qty: 9 | Refills: 0 | Status: ACTIVE | COMMUNITY
Start: 2025-09-17 | End: 1900-01-01

## 2025-09-17 RX ORDER — ACETAMINOPHEN AND CODEINE PHOSPHATE 300; 15 MG/1; MG/1
300-15 TABLET ORAL
Qty: 10 | Refills: 0 | Status: ACTIVE | COMMUNITY
Start: 2025-09-17 | End: 1900-01-01

## 2025-09-17 RX ORDER — DOCUSATE SODIUM 100 MG/1
100 CAPSULE ORAL TWICE DAILY
Qty: 28 | Refills: 0 | Status: ACTIVE | COMMUNITY
Start: 2025-09-17 | End: 1900-01-01

## 2025-09-19 ENCOUNTER — APPOINTMENT (OUTPATIENT)
Dept: UROLOGY | Facility: CLINIC | Age: 80
End: 2025-09-19
Payer: MEDICARE

## 2025-09-19 VITALS
TEMPERATURE: 98.3 F | DIASTOLIC BLOOD PRESSURE: 77 MMHG | HEART RATE: 69 BPM | SYSTOLIC BLOOD PRESSURE: 158 MMHG | WEIGHT: 124 LBS | HEIGHT: 63 IN | BODY MASS INDEX: 21.97 KG/M2

## 2025-09-19 DIAGNOSIS — N20.1 CALCULUS OF URETER: ICD-10-CM

## 2025-09-19 PROCEDURE — 52310 CYSTOSCOPY AND TREATMENT: CPT

## (undated) DEVICE — SAW BLADE WRIGHT MEDICAL NARROW 0.315"

## (undated) DEVICE — MIDAS REX MR8 MATCH HEAD FLUTED LG BORE 3MM X 14CM

## (undated) DEVICE — SAW BLADE LINVATEC SAGITTAL MIC 9.5X25.5X0.4MM

## (undated) DEVICE — PREP DURAPREP 26CC

## (undated) DEVICE — DRAPE BACK TABLE COVER 80X90"

## (undated) DEVICE — SUT STRATAFIX SYMMETRIC PDS 1 45CM OS-6

## (undated) DEVICE — DRAPE 3/4 SHEET 52X76"

## (undated) DEVICE — ELCTR STRYKER NEPTUNE SMOKE EVACUATION PENCIL (GREEN)

## (undated) DEVICE — WARMING BLANKET UPPER ADULT

## (undated) DEVICE — DRAPE INSTRUMENT POUCH 6.75" X 11"

## (undated) DEVICE — DRAPE GENERAL ENDOSCOPY

## (undated) DEVICE — GLV 8 PROTEXIS (WHITE)

## (undated) DEVICE — SAW BLADE STRYKER RECIPROCATING 77.6X0.77X11.2MM

## (undated) DEVICE — MARKING PEN W RULER

## (undated) DEVICE — DRSG WEBRIL 6"

## (undated) DEVICE — DRAIN JACKSON PRATT 3 SPRING RESERVOIR W 10FR PVC DRAIN

## (undated) DEVICE — STAPLER SKIN PROXIMATE

## (undated) DEVICE — SLV COMPRESSION KNEE MED

## (undated) DEVICE — SUT CHROMIC 4-0 18" PS-2

## (undated) DEVICE — SUT VICRYL 2-0 27" CT-2 UNDYED

## (undated) DEVICE — DEPUY CANNULA FEN OPEN STERILE

## (undated) DEVICE — SUT VICRYL 2-0 27" CT-1 UNDYED

## (undated) DEVICE — SUT VICRYL 1 36" CTB-1 UNDYED

## (undated) DEVICE — BUR LINVATEC OVAL MEDIUM 4MM CARBIDE

## (undated) DEVICE — SURGIPHOR STERILE WOUND IRR POVIDONE IODINE

## (undated) DEVICE — TOURNIQUET CUFF 34" DUAL PORT W PLC

## (undated) DEVICE — POSITIONER JACKSON TABLE XODUS

## (undated) DEVICE — DRAPE 1/2 SHEET 40X57"

## (undated) DEVICE — STRYKER BONE MILL BLADE MEDIUM 5.0MM

## (undated) DEVICE — SUT VICRYL 2-0 27" SH UNDYED

## (undated) DEVICE — VENODYNE/SCD SLEEVE CALF MEDIUM

## (undated) DEVICE — SUT MONOCRYL 4-0 18" PS-2

## (undated) DEVICE — DRAPE C ARM MINI PACK FOR 6800

## (undated) DEVICE — GLV 9 PROTEXIS ORTHO (BROWN)

## (undated) DEVICE — NDL SPINAL 18G X 3.5" (PINK)

## (undated) DEVICE — DRSG ACE BANDAGE 6"

## (undated) DEVICE — TOURNIQUET ESMARK 6"

## (undated) DEVICE — SAW BLADE WRIGHT MEDICAL WIDE 0.5"

## (undated) DEVICE — PACK SPINE

## (undated) DEVICE — MIDAS REX MR8 BALL FLUTED LG BORE 5MM X 14CM

## (undated) DEVICE — PACK ORTHO FOOT ANKLE

## (undated) DEVICE — SIMPULSE SOLO SUCTION / IRRIGATOR

## (undated) DEVICE — DRSG WEBRIL 4"

## (undated) DEVICE — BUR LINVATEC OVAL LONG CARBIDE 4 X 8MM